# Patient Record
Sex: FEMALE | Race: BLACK OR AFRICAN AMERICAN | Employment: OTHER | URBAN - METROPOLITAN AREA
[De-identification: names, ages, dates, MRNs, and addresses within clinical notes are randomized per-mention and may not be internally consistent; named-entity substitution may affect disease eponyms.]

---

## 2017-07-14 ENCOUNTER — HOSPITAL ENCOUNTER (OUTPATIENT)
Dept: BONE DENSITY | Age: 77
Discharge: HOME | End: 2017-07-14
Attending: NURSE PRACTITIONER
Payer: MEDICARE

## 2017-07-14 DIAGNOSIS — M81.0 AGE-RELATED OSTEOPOROSIS WITHOUT CURRENT PATHOLOGICAL FRACTURE: ICD-10-CM

## 2017-07-14 PROCEDURE — 77080 DXA BONE DENSITY AXIAL: CPT

## 2017-12-27 ENCOUNTER — HOSPITAL ENCOUNTER (OUTPATIENT)
Dept: GENERAL RADIOLOGY | Age: 77
Discharge: HOME | End: 2017-12-27
Payer: MEDICARE

## 2017-12-27 DIAGNOSIS — I20.0 INTERMEDIATE CORONARY SYNDROME (HCC): ICD-10-CM

## 2017-12-27 PROCEDURE — 71020 XR CHEST PA LAT: CPT

## 2018-09-10 ENCOUNTER — HOSPITAL ENCOUNTER (OUTPATIENT)
Dept: GENERAL RADIOLOGY | Age: 78
Discharge: HOME | End: 2018-09-10
Payer: MEDICARE

## 2018-09-10 DIAGNOSIS — R91.8 LUNG MASS: ICD-10-CM

## 2018-09-10 PROCEDURE — 71046 X-RAY EXAM CHEST 2 VIEWS: CPT

## 2022-07-27 ENCOUNTER — APPOINTMENT (OUTPATIENT)
Dept: CT IMAGING | Age: 82
End: 2022-07-27
Attending: EMERGENCY MEDICINE
Payer: MEDICARE

## 2022-07-27 ENCOUNTER — APPOINTMENT (OUTPATIENT)
Dept: GENERAL RADIOLOGY | Age: 82
End: 2022-07-27
Attending: PHYSICIAN ASSISTANT
Payer: MEDICARE

## 2022-07-27 ENCOUNTER — HOSPITAL ENCOUNTER (EMERGENCY)
Age: 82
Discharge: HOME | End: 2022-07-27
Attending: EMERGENCY MEDICINE
Payer: MEDICARE

## 2022-07-27 VITALS
WEIGHT: 153.22 LBS | BODY MASS INDEX: 27.15 KG/M2 | TEMPERATURE: 97.9 F | RESPIRATION RATE: 14 BRPM | OXYGEN SATURATION: 98 % | HEIGHT: 63 IN | SYSTOLIC BLOOD PRESSURE: 138 MMHG | DIASTOLIC BLOOD PRESSURE: 78 MMHG | HEART RATE: 80 BPM

## 2022-07-27 DIAGNOSIS — S20.219A STERNAL CONTUSION, INITIAL ENCOUNTER: Primary | ICD-10-CM
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
## 2022-07-27 PROCEDURE — 99284 EMERGENCY DEPT VISIT MOD MDM: CPT

## 2022-07-27 PROCEDURE — 71250 CT THORAX DX C-: CPT

## 2022-07-27 PROCEDURE — 74011250637 HC RX REV CODE- 250/637: Performed by: EMERGENCY MEDICINE

## 2022-07-27 RX ORDER — NAPROXEN 500 MG/1
500 TABLET ORAL
Qty: 10 TABLET | Refills: 0 | Status: SHIPPED | OUTPATIENT
Start: 2022-07-27 | End: 2022-07-27

## 2022-07-27 RX ORDER — NAPROXEN 500 MG/1
500 TABLET ORAL
Qty: 10 TABLET | Refills: 0 | Status: SHIPPED | OUTPATIENT
Start: 2022-07-27 | End: 2022-08-01

## 2022-07-27 RX ORDER — ACETAMINOPHEN 500 MG
1000 TABLET ORAL ONCE
Status: COMPLETED | OUTPATIENT
Start: 2022-07-27 | End: 2022-07-27

## 2022-07-27 RX ADMIN — ACETAMINOPHEN 1000 MG: 500 TABLET ORAL at 10:54

## 2022-07-27 NOTE — ED NOTES
I have reviewed discharge instructions with the patient. The patient verbalized understanding. Pt walked out ER with steady gait.

## 2022-07-27 NOTE — ED NOTES
1000: Assumed care of Pt at this time. Pt is alert and oriented at this time. Pt is well appearing and clear speech. Pt HAS NO RESPIRATORY ISSUES (SOME MUSCULOSKELETAL PAIN WITH BREATHING.) No cardiac issues noted at this time. Pt c/o falling/tripping over one of her grand kids sleeping on the floor this morning and landed on the couch arm that is not padded. Pain is sternal and does not hurt at rest only when moving and taking deep breaths. Pt is sitting well in chair.

## 2022-07-27 NOTE — ED PROVIDER NOTES
EMERGENCY DEPARTMENT HISTORY AND PHYSICAL EXAM      Date: 7/27/2022  Patient Name: Poncho Camargo    History of Presenting Illness     Chief Complaint   Patient presents with    Sternum Injury     Tripped and fell this morning into a sofa on her sternum fell into the arm of her sofa. Pain at sternum bone when she moves-sitting still is no pain. History Provided By: Patient    HPI: Poncho Camargo, 81 y.o. female  presents to the ED with cc of sternum and rib pain. Patient states she tripped over one of her grandsons laying in the floor this morning. She fell onto the arm of her sofa which hit her right in the center of the chest.  Patient states at rest she has no pain. Movement and deep breaths causes pain in the center of her chest.  No other injuries reported. She did not hit her head. Past History     Past Medical History:  Past Medical History:   Diagnosis Date    Hypertension     Thyroid disease        Past Surgical History:  Past Surgical History:   Procedure Laterality Date    HX ORTHOPAEDIC      R knee       Medications:  No current facility-administered medications on file prior to encounter. Current Outpatient Medications on File Prior to Encounter   Medication Sig Dispense Refill    alendronate (FOSAMAX) 10 mg tablet Take 70 mg by mouth Daily (before breakfast). Amlodipine-Olmesartan 5-20 mg Tab Take  by mouth. hydrochlorothiazide (MICROZIDE) 12.5 mg capsule Take 12.5 mg by mouth daily. levothyroxine (LEVOTHROID) 100 mcg tablet Take 100 mcg by mouth Daily (before breakfast). simvastatin (ZOCOR) 20 mg tablet Take  by mouth nightly. Family History:  No family history on file. Social History:  Social History     Tobacco Use    Smoking status: Never    Smokeless tobacco: Never   Substance Use Topics    Alcohol use: No       Allergies:  No Known Allergies    All the above components of the past  history are auto-populated from the electronic record. They have been reviewed and the patient has been interviewed for any pertinent past history that pertains to the patient's chief complaint and reason for visit. Not all pre-populated components may be accurate at the time this note was generated. Review of Systems   Review of Systems   Constitutional:  Negative for chills and fever. HENT:  Negative for congestion, ear pain, rhinorrhea, sore throat and trouble swallowing. Eyes:  Negative for visual disturbance. Respiratory:  Negative for cough, chest tightness and shortness of breath. Cardiovascular:  Negative for chest pain and palpitations. Gastrointestinal:  Negative for abdominal pain, blood in stool, constipation, diarrhea, nausea and vomiting. Genitourinary:  Negative for decreased urine volume, difficulty urinating, dysuria and frequency. Musculoskeletal:  Negative for back pain and neck pain. Sternal pain   Skin:  Negative for color change and rash. Neurological:  Negative for dizziness, weakness, light-headedness and headaches. Physical Exam   Physical Exam  Vitals and nursing note reviewed. Constitutional:       General: She is not in acute distress. Appearance: She is well-developed. She is not ill-appearing. HENT:      Head: Normocephalic. Eyes:      Conjunctiva/sclera: Conjunctivae normal.   Cardiovascular:      Rate and Rhythm: Normal rate and regular rhythm. Pulmonary:      Effort: Pulmonary effort is normal. No accessory muscle usage or respiratory distress. Chest:      Chest wall: Tenderness present. Abdominal:      General: There is no distension. Musculoskeletal:      Cervical back: Normal range of motion. Skin:     General: Skin is warm and dry. Neurological:      Mental Status: She is alert and oriented to person, place, and time. Diagnostic Study Results     Labs -   No results found for this or any previous visit (from the past 24 hour(s)).     Radiologic Studies -   CT CHEST WO CONT Final Result   Bilateral lower lobe atelectasis. Stable 9 mm lingular nodule with   calcification. No acute abnormality. CT Results  (Last 48 hours)                 07/27/22 1019  CT CHEST WO CONT Final result    Impression:  Bilateral lower lobe atelectasis. Stable 9 mm lingular nodule with   calcification. No acute abnormality. Narrative:  INDICATION: Status post fall with sternal injury       COMPARISON: 5/9/2007       CONTRAST: None. TECHNIQUE:  5 mm axial images were obtained through the chest. Coronal and   sagittal reformats were generated. CT dose reduction was achieved through use   of a standardized protocol tailored for this examination and automatic exposure   control for dose modulation. The absence of intravenous contrast reduces the sensitivity for evaluation of   the mediastinum, marisol, vasculature, and upper abdominal organs. FINDINGS:       CHEST WALL: No mass or axillary lymphadenopathy. THYROID: No nodule. MEDIASTINUM: No mass or lymphadenopathy. MARISOL: No mass or lymphadenopathy. THORACIC AORTA: No aneurysm. MAIN PULMONARY ARTERY: Normal in caliber. TRACHEA/BRONCHI: Patent. ESOPHAGUS: No wall thickening or dilatation. HEART: Normal in size. PLEURA: No effusion or pneumothorax. LUNGS: 9 mm lingular nodule with central calcification is unchanged. Bilateral   lower lobe atelectasis is noted. INCIDENTALLY IMAGED UPPER ABDOMEN: No significant abnormality in the   incidentally imaged upper abdomen. BONES: Degenerative changes are seen in the thoracic spine. No acute fracture. CXR Results  (Last 48 hours)      None              Medical Decision Making     I reviewed the vital signs, available nursing notes, past medical history, past surgical history, family history and social history. Vital Signs-I have reviewed the vital signs that have been made available during the patient's emergency department visit.   The vital signs auto-populated below are obtained mostly by electronic means through monitoring devices that have been downloaded into the patient's chart by the nursing staff. Some vital signs are not downloaded into the chart until after the patient has been discharged and this note has been completed, therefore some vital signs may not be available to the physician for review prior to patient's discharge or admission. The physician has reviewed the patient's triage vital signs, monitored the electronic monitoring devices remotely for any significant vital sign abnormalities, and have reviewed vital signs prior to discharge. Some vital signs reviewed at bedside or remotely utilizing electronic monitoring devices may be different than the vital signs downloaded into the electronic medical record. Some vital signs may be erroneous and inaccurate since they are obtained by electronic monitoring devices, and not all vital signs are verified for accuracy by nursing staff prior to downloading into the patient's chart. Patient Vitals for the past 24 hrs:   Temp Pulse Resp BP SpO2   07/27/22 0949 97.9 °F (36.6 °C) 80 14 138/78 98 %         Records Reviewed: Nursing notes for today's visit have been reviewed. I have also reviewed most recent medical records pertinent to today's complaints, if available in our medical record system. I have also reviewed all labs and imaging results from previous results in comparison to results obtained today. If an EKG was obtained today, it has been compared to previous EKGs, if available. If arriving via EMS, the EMS report has been reviewed if made available to us within the patient's time in the emergency department. Provider Notes (Medical Decision Making):   Patient presents after fall onto her sofa. She has sternal pain and tenderness on exam.  CT imaging of the chest was obtained given the patient's advanced age. CT imaging did not show any sternal or rib fractures. No pneumothorax. Will recommend treatment of contusion of the chest wall. This may include acetaminophen and will prescribe naproxen. Apply ice for the next 2 days. Follow-up with primary care. ED Course:   Initial assessment performed. The patients presenting problems have been discussed, and they are in agreement with the care plan formulated and outlined with them. I have encouraged them to ask questions as they arise throughout their visit. Orders Placed This Encounter    CT CHEST WO CONT    acetaminophen (TYLENOL) tablet 1,000 mg    naproxen (NAPROSYN) 500 mg tablet       Procedures      Critical Care Time:   0    Disposition:  Discharge    The patient's emergency department evaluation is now complete. I have reviewed all labs, imaging, and pertinent information. I have discussed all results with the patient and/or family. Based on our evaluation today I do believe that the patient is safe to be discharged home. The patient has been provided with at home instructions that are pertinent to their complaint today, although these may not be specific to the exact diagnosis. I have reviewed the patient's home medications and attempted to reconcile if not already done so by pharmacy or nursing staff. I have discussed all new prescriptions with the patient. The patient has been encouraged to follow-up with primary care doctor and/or specialist, and these have been discussed with the patient. The patient has been advised that they may return to the emergency department if they have any worsening symptoms and or new symptoms that are of concern to them. Verbal discharge instructions may have also been provided to the patient that may not be specifically contained in the written discharge instructions. The patient has been given opportunity to ask questions prior to discharge. PLAN:  1.    Current Discharge Medication List        START taking these medications    Details   naproxen (NAPROSYN) 500 mg tablet Take 1 Tablet by mouth every twelve (12) hours as needed for Pain for up to 5 days. Qty: 10 Tablet, Refills: 0  Start date: 7/27/2022, End date: 8/1/2022           2. Follow-up Information       Follow up With Specialties Details Why Contact Info    Danny Dawson MD Family Medicine Schedule an appointment as soon as possible for a visit in 1 week  [facility]  [address] [phone]            Return to ED if worse     Diagnosis     Clinical Impression:   1. Sternal contusion, initial encounter    2.  Fall, initial encounter

## 2023-01-26 ENCOUNTER — OFFICE VISIT (OUTPATIENT)
Dept: CARDIOLOGY CLINIC | Age: 83
End: 2023-01-26
Payer: MEDICARE

## 2023-01-26 VITALS
HEIGHT: 62 IN | HEART RATE: 86 BPM | SYSTOLIC BLOOD PRESSURE: 125 MMHG | RESPIRATION RATE: 17 BRPM | DIASTOLIC BLOOD PRESSURE: 73 MMHG | WEIGHT: 160 LBS | OXYGEN SATURATION: 95 % | BODY MASS INDEX: 29.44 KG/M2

## 2023-01-26 DIAGNOSIS — R01.1 HEART MURMUR: ICD-10-CM

## 2023-01-26 DIAGNOSIS — M79.89 LEG SWELLING: Primary | ICD-10-CM

## 2023-01-26 PROCEDURE — 93000 ELECTROCARDIOGRAM COMPLETE: CPT | Performed by: NURSE PRACTITIONER

## 2023-01-26 PROCEDURE — 1123F ACP DISCUSS/DSCN MKR DOCD: CPT | Performed by: NURSE PRACTITIONER

## 2023-01-26 PROCEDURE — 99204 OFFICE O/P NEW MOD 45 MIN: CPT | Performed by: NURSE PRACTITIONER

## 2023-01-26 RX ORDER — FUROSEMIDE 20 MG/1
20 TABLET ORAL
COMMUNITY
Start: 2023-01-13

## 2023-01-26 RX ORDER — AMLODIPINE AND BENAZEPRIL HYDROCHLORIDE 5; 20 MG/1; MG/1
CAPSULE ORAL
COMMUNITY
Start: 2022-10-31 | End: 2023-01-26

## 2023-01-26 RX ORDER — FUROSEMIDE 20 MG/1
TABLET ORAL
Qty: 30 TABLET | Refills: 3 | Status: SHIPPED | OUTPATIENT
Start: 2023-01-26

## 2023-01-26 RX ORDER — BENAZEPRIL HYDROCHLORIDE 20 MG/1
20 TABLET ORAL DAILY
Qty: 90 TABLET | Refills: 2 | Status: SHIPPED | OUTPATIENT
Start: 2023-01-26

## 2023-01-26 NOTE — PATIENT INSTRUCTIONS
We are going to get an echocardiogram to take pictures of your heart to see if this could be a source of the swelling in your legs. Take your prescribed furosemide 20 mg 2 times daily for 5 days. This should help at least a little bit with the swelling. After you have completed the 5 days, start taking it 20 mg once daily (in the morning). Go to the lab to have your blood drawn today and we will try to follow-up with you on the results in less than 1 week. Stop taking your benazepril-amlodipine medication. The amlodipine could possibly be contributing to the swelling in your legs. Instead, I would like you to take benazepril 20 mg daily since her blood pressure is very well controlled at this point in time. We would like to see you back in about 3 weeks. It is very important to try and keep your legs elevated while you are resting. I also recommend trying your compression stockings again to see if this does help a little bit with the other measures at reducing your swelling.

## 2023-01-26 NOTE — PROGRESS NOTES
Subjective:   Natalie Mcneil is a 81 y.o.  female who presents to the clinic today to be evaluated for progressive bilateral lower extremity edema. She states that for the last month or so she started develop a sensation of bilateral leg heaviness/tightness and swelling. At the time that her symptoms started, she denies any significant lifestyle changes or medication changes. She tried using compression stockings for less than a week, and stopped using them because she did not notice any difference in the swelling. She also states that within the last 1 to 2 weeks she was also started on 20 mg of Lasix daily and was told to discontinue her prescribed HCTZ (12.5 mg). Likewise, she states that these adjustments have not resulted in any significant improvement in her lower extremity edema. She denies a history of similar symptoms. She seems to be in a very good state of health overall and she specifically denies any abnormal fatigue, lightheadedness/dizziness, palpitations, orthopnea, PND, new cough, shortness of breath, chest pains, or decreased exercise tolerance. She lives a fairly sedentary lifestyle and states that she sits with her legs in a dependent position for close to 80% of the day. Perhaps it could be most contributory she states that she has had a bug infestation in her house recently and for the last 3 weeks she has been sleeping in a chair instead of her bed. She has never had any echocardiograms, cardiac stress tests, or cardiac catheterizations. Primary Care Physician: Lety Angeles MD    Tobacco use: Never  Alcohol use: No  Illicit drug use: Never  Occupation: She is now retired. She used to work in a  center. Physical activity: Fairly sedentary lifestyle. States she is in a seated position about 80% of the day. No limitations with normal activities.   Cardiac risk factors: sedentary life style, hypertension, post-menopausal.    There is no problem list on file for this patient. No specialty comments available. Current Outpatient Medications   Medication Sig Dispense Refill    amLODIPine-benazepril (LOTREL) 5-20 mg per capsule TAKE 1 CAPSULE BY MOUTH EVERYDAY AT BEDTIME      furosemide (LASIX) 20 mg tablet Take 20 mg by mouth daily. levothyroxine (SYNTHROID) 100 mcg tablet Take 100 mcg by mouth Daily (before breakfast). simvastatin (ZOCOR) 20 mg tablet Take  by mouth nightly. Amlodipine-Olmesartan 5-20 mg Tab Take 1 Tablet by mouth daily. (Patient not taking: Reported on 1/26/2023)      hydrochlorothiazide (MICROZIDE) 12.5 mg capsule Take 12.5 mg by mouth daily. (Patient not taking: Reported on 1/26/2023)       No Known Allergies  Past Medical History:   Diagnosis Date    Hypertension     Thyroid disease      History reviewed. No pertinent family history. Past Surgical History:   Procedure Laterality Date    HX ORTHOPAEDIC      R knee     Social History     Tobacco Use    Smoking status: Never    Smokeless tobacco: Never   Substance Use Topics    Alcohol use: No        No results found for: CHOL, CHOLPOCT, CHOLX, CHLST, CHOLV, TOTCHOLEXT, HDL, HDLPOC, HDLEXT, HDLP, LDL, LDLCPOC, LDLCEXT, LDLC, DLDLP, VLDLC, VLDL, TGLX, TRIGL, TRIGLYCEXT, TRIGP, TGLPOCT, CHHD, CHHDX     No results found for: HBA1C, ZII7JNQV, HFG3YUHL     No results found for: NA, K, CL, CO2, AGAP, GLU, BUN, CREA, BUCR, GFRAA, GFRNA, CA, TBIL, TBILI, AP, TP, ALB, GLOB, AGRAT, ALT, AST     No results found for: WBC, WBCLT, HGBPOC, HGB, HGBP, HCTPOC, HCT, PHCT, RBCH, PLT, MCV, HGBEXT, HCTEXT, PLTEXT     Visit Vitals  /73 (BP 1 Location: Left upper arm, BP Patient Position: Sitting, BP Cuff Size: Large adult)   Pulse 86   Resp 17   Ht 5' 2.21\" (1.58 m)   Wt 160 lb (72.6 kg)   SpO2 95%   BMI 29.07 kg/m²    Body mass index is 29.07 kg/m². Review of Systems   Respiratory:  Negative for cough and shortness of breath. Cardiovascular:  Positive for leg swelling.  Negative for chest pain, palpitations, orthopnea, claudication and PND. Neurological:  Negative for dizziness. All other systems reviewed and are negative. Physical Exam  Vitals and nursing note reviewed. Constitutional:       General: She is not in acute distress. Appearance: She is obese. She is not ill-appearing. HENT:      Head: Normocephalic. Eyes:      General: Vision grossly intact. Extraocular Movements:      Right eye: No nystagmus. Left eye: No nystagmus. Neck:      Vascular: No carotid bruit or JVD. Cardiovascular:      Rate and Rhythm: Normal rate and regular rhythm. Pulses: Normal pulses. Heart sounds: No midsystolic click. Murmur heard. Systolic murmur is present with a grade of 4/6 at the upper left sternal border. Mild palpable thrill. Comments: There is taught edema present in both lower extremities with signs of mild stasis dermatitis. There is no extension of the edema above the level of the knees. The skin feels profusely warm and well-perfused. There is no subjective sensory disturbances of the lower extremities. There are no wounds to indicate PVD. There are no significant varicosities in the lower extremities. Pulmonary:      Effort: Pulmonary effort is normal. No tachypnea or respiratory distress. Breath sounds: No wheezing or rales. Chest:      Chest wall: No swelling. Abdominal:      General: Abdomen is flat. There is no distension. Tenderness: There is no abdominal tenderness. There is no guarding or rebound. Musculoskeletal:      Cervical back: Neck supple. No rigidity. Skin:     Comments: Patchy areas of hypopigmentation consistent with vitiligo   Neurological:      Mental Status: She is alert and oriented to person, place, and time. Mental status is at baseline. Motor: Motor function is intact. Coordination: Coordination is intact.    Psychiatric:         Attention and Perception: Attention and perception normal. EKG: Normal sinus rhythm. No ectopy. Normal axis and intervals. No ischemic abnormalities. Assessment/Plan:   1. Heart murmur  -She denies a history of a heart murmur. Luckily, she is not having any concerning cardiopulmonary complaints. However, based on her new symptoms of extremity swelling and murmur with palpable thrill, I did recommend a screening echocardiogram.    2. Leg swelling  -I do have some suspicion that the leg swelling could be coming from increased time with her legs bent to the dependent position. Her 5 mg of amlodipine, while it is not new, could possibly be contributing. Her blood pressure is perfect today. I did recommend discontinuing her amlodipine for now and taking 20 mg of Lasix twice daily x5 days and then once daily after that. She is going to get labs drawn immediately after her visit today and I will try to follow-up on those as soon as the results come back. She is to continue her benazepril at 20 mg. I provided extensive education on increasing her physical activity to help alleviate the edema as well as other measures such as using her compression stockings and elevating her legs when she is resting. Prior to this visit I have reviewed key aspects of the patient's medical record to optimize medical decision making. This includes, but is not limited to, prior office visits and emergency room encounters (if applicable). I have reviewed pertinent diagnostic test results (when available), vital signs, weights & and ambulatory blood pressure readings (when available), as well as personal and family medical history to develop an individualized care plan. I have spent time discussing both pharmacological and nonpharmacological treatment and preventative care measures for optimizing cardiovascular health and wellness.  This includes, but is not limited to: obtaining regular physical activity as tolerated, achieving ideal weight and blood pressure, healthy eating habits, smoking cessation, limiting or eliminating alcohol intake, and avoidance of recreational drug use. I have emphasized the importance of adherence to the current medication regimen as well as routine follow up for preventative care measures.       Igor Anton NP  [facility] at [address], [address], [facility] [address]: [phone]  F: [phone]

## 2023-01-26 NOTE — PROGRESS NOTES
Patient is not sure of the name of her medications. I advised to call me back when she gets home to update her list on file.

## 2023-01-27 LAB
ALBUMIN SERPL-MCNC: 4.7 G/DL (ref 3.6–4.6)
ALBUMIN/GLOB SERPL: 2.1 {RATIO} (ref 1.2–2.2)
ALP SERPL-CCNC: 85 IU/L (ref 44–121)
ALT SERPL-CCNC: 21 IU/L (ref 0–32)
AST SERPL-CCNC: 19 IU/L (ref 0–40)
BILIRUB SERPL-MCNC: 0.6 MG/DL (ref 0–1.2)
BUN SERPL-MCNC: 12 MG/DL (ref 8–27)
BUN/CREAT SERPL: 18 (ref 12–28)
CALCIUM SERPL-MCNC: 9.7 MG/DL (ref 8.7–10.3)
CHLORIDE SERPL-SCNC: 104 MMOL/L (ref 96–106)
CO2 SERPL-SCNC: 23 MMOL/L (ref 20–29)
CREAT SERPL-MCNC: 0.67 MG/DL (ref 0.57–1)
EGFR: 88 ML/MIN/1.73
GLOBULIN SER CALC-MCNC: 2.2 G/DL (ref 1.5–4.5)
GLUCOSE SERPL-MCNC: 100 MG/DL (ref 70–99)
MAGNESIUM SERPL-MCNC: 2.3 MG/DL (ref 1.6–2.3)
NT-PROBNP SERPL-MCNC: 103 PG/ML (ref 0–738)
POTASSIUM SERPL-SCNC: 4 MMOL/L (ref 3.5–5.2)
PROT SERPL-MCNC: 6.9 G/DL (ref 6–8.5)
SODIUM SERPL-SCNC: 144 MMOL/L (ref 134–144)

## 2023-01-30 ENCOUNTER — TELEPHONE (OUTPATIENT)
Dept: CARDIOLOGY CLINIC | Age: 83
End: 2023-01-30

## 2023-01-30 NOTE — TELEPHONE ENCOUNTER
I called and spoke with the patient, two identifiers verified. I have thoroughly discussed with the patient about provider's recommendation:  ----- Message from Bela Borja NP sent at 1/29/2023  9:20 PM EST -----  Regarding: Lab results  Shailesh Arevalo,    We can let Mrs Luisana Garcia know that her labs looked good. We will continue with the plan to do lasix BID x 5 days and once daily from there. We will see how her leg swelling does with this along with discontinuing her amlodipine. I believe we told her to see us back within 2-3 weeks after her visit last week. \"    Patient confirmed taking lasix BID, currently on her 4th day today. Stopped her Amlodipine. Still has leg swelling. I have also advised to elevate legs with 2-3 pillows and walk as a good form of exercise. Patient stated that she eats a lot of canned foods. I have also educated her about the importance of low/limit sodium in her diet. All questions answered. She verbalized understanding. No other concerns at this time.

## 2023-02-03 ENCOUNTER — HOSPITAL ENCOUNTER (EMERGENCY)
Age: 83
Discharge: HOME | End: 2023-02-03
Attending: EMERGENCY MEDICINE
Payer: MEDICARE

## 2023-02-03 VITALS
RESPIRATION RATE: 14 BRPM | TEMPERATURE: 98.1 F | HEART RATE: 93 BPM | WEIGHT: 161.82 LBS | DIASTOLIC BLOOD PRESSURE: 99 MMHG | SYSTOLIC BLOOD PRESSURE: 142 MMHG | HEIGHT: 62 IN | BODY MASS INDEX: 29.78 KG/M2 | OXYGEN SATURATION: 98 %

## 2023-02-03 DIAGNOSIS — R20.2 PARESTHESIAS IN RIGHT HAND: Primary | ICD-10-CM

## 2023-02-03 DIAGNOSIS — R20.2 PARESTHESIAS IN LEFT HAND: ICD-10-CM

## 2023-02-03 LAB
ALBUMIN SERPL-MCNC: 3.6 G/DL (ref 3.5–5)
ALBUMIN/GLOB SERPL: 1.1 (ref 1.1–2.2)
ALP SERPL-CCNC: 84 U/L (ref 45–117)
ALT SERPL-CCNC: 28 U/L (ref 12–78)
ANION GAP SERPL CALC-SCNC: 5 MMOL/L (ref 5–15)
AST SERPL-CCNC: 17 U/L (ref 15–37)
BASOPHILS # BLD: 0 K/UL (ref 0–0.1)
BASOPHILS NFR BLD: 0 % (ref 0–1)
BILIRUB SERPL-MCNC: 0.7 MG/DL (ref 0.2–1)
BUN SERPL-MCNC: 11 MG/DL (ref 6–20)
BUN/CREAT SERPL: 18 (ref 12–20)
CALCIUM SERPL-MCNC: 9.1 MG/DL (ref 8.5–10.1)
CHLORIDE SERPL-SCNC: 108 MMOL/L (ref 97–108)
CO2 SERPL-SCNC: 27 MMOL/L (ref 21–32)
COMMENT, HOLDF: NORMAL
CREAT SERPL-MCNC: 0.62 MG/DL (ref 0.55–1.02)
DIFFERENTIAL METHOD BLD: NORMAL
EOSINOPHIL # BLD: 0.1 K/UL (ref 0–0.4)
EOSINOPHIL NFR BLD: 1 % (ref 0–7)
ERYTHROCYTE [DISTWIDTH] IN BLOOD BY AUTOMATED COUNT: 13.3 % (ref 11.5–14.5)
GLOBULIN SER CALC-MCNC: 3.4 G/DL (ref 2–4)
GLUCOSE SERPL-MCNC: 100 MG/DL (ref 65–100)
HCT VFR BLD AUTO: 40.5 % (ref 35–47)
HGB BLD-MCNC: 13.8 G/DL (ref 11.5–16)
IMM GRANULOCYTES # BLD AUTO: 0 K/UL (ref 0–0.04)
IMM GRANULOCYTES NFR BLD AUTO: 0 % (ref 0–0.5)
LYMPHOCYTES # BLD: 2.8 K/UL (ref 0.8–3.5)
LYMPHOCYTES NFR BLD: 34 % (ref 12–49)
MCH RBC QN AUTO: 29.9 PG (ref 26–34)
MCHC RBC AUTO-ENTMCNC: 34.1 G/DL (ref 30–36.5)
MCV RBC AUTO: 87.9 FL (ref 80–99)
MONOCYTES # BLD: 0.7 K/UL (ref 0–1)
MONOCYTES NFR BLD: 9 % (ref 5–13)
NEUTS SEG # BLD: 4.5 K/UL (ref 1.8–8)
NEUTS SEG NFR BLD: 56 % (ref 32–75)
NRBC # BLD: 0 K/UL (ref 0–0.01)
NRBC BLD-RTO: 0 PER 100 WBC
PLATELET # BLD AUTO: 209 K/UL (ref 150–400)
PMV BLD AUTO: 9.5 FL (ref 8.9–12.9)
POTASSIUM SERPL-SCNC: 4.1 MMOL/L (ref 3.5–5.1)
PROT SERPL-MCNC: 7 G/DL (ref 6.4–8.2)
RBC # BLD AUTO: 4.61 M/UL (ref 3.8–5.2)
SAMPLES BEING HELD,HOLD: NORMAL
SODIUM SERPL-SCNC: 140 MMOL/L (ref 136–145)
TSH SERPL DL<=0.05 MIU/L-ACNC: 3.26 UIU/ML (ref 0.36–3.74)
WBC # BLD AUTO: 8.2 K/UL (ref 3.6–11)

## 2023-02-03 PROCEDURE — 99283 EMERGENCY DEPT VISIT LOW MDM: CPT

## 2023-02-03 PROCEDURE — 85025 COMPLETE CBC W/AUTO DIFF WBC: CPT

## 2023-02-03 PROCEDURE — 80053 COMPREHEN METABOLIC PANEL: CPT

## 2023-02-03 PROCEDURE — 84443 ASSAY THYROID STIM HORMONE: CPT

## 2023-02-03 PROCEDURE — 36415 COLL VENOUS BLD VENIPUNCTURE: CPT

## 2023-02-04 NOTE — ED PROVIDER NOTES
[facility] EMERGENCY DEPT  EMERGENCY DEPARTMENT ENCOUNTER       Pt Name: Rajesh Saucedo  MRN: [id number]  [place] [birth year]  Date of evaluation: 2/3/2023  Provider: Jean Paul Monreal MD   PCP: Heber Bullock MD  Note Started: 7:34 PM 2/3/23     CHIEF COMPLAINT       Chief Complaint   Patient presents with    Numbness     On Wednesday at first noticed the 4th an 5th fingers felt numb of the right hand  and then it progressed to  all fingers on right hand and now on the left hand today        HISTORY OF PRESENT ILLNESS: 1 or more elements      History From: Patient, daughter, History limited by: No limitations     Rajesh Saucedo is a 81 y.o. female with history of hypertension, hypothyroidism, leg edema who presents with chief complaint of paresthesias to bilateral upper extremities. Symptoms started in the fourth and fifth digits of the right side onset about 3 days ago, they then proceeded into the remaining 1 through 5 digits on the right side. She describes paresthesias but no numbness or weakness. It does not extend into the hand or thumb. Over the past 1 to 2 days she had the same symptoms occurred to the distal digits on the left side again excluding the thumb and hand. She denies any recent medication changes except for being instructed to double up on her Lasix for increased leg swelling. Otherwise denies any recent medication changes. She denies any new weakness, numbness. No injury or trauma. She denies any headache, change in vision, dysarthria, dysphagia, or difficulty with balance. Nursing Notes were all reviewed and agreed with or any disagreements were addressed in the HPI. REVIEW OF SYSTEMS        Positives and Pertinent negatives as per HPI.     PAST HISTORY     Past Medical History:  Past Medical History:   Diagnosis Date    Hypertension     Thyroid disease        Past Surgical History:  Past Surgical History:   Procedure Laterality Date    HX ORTHOPAEDIC      R knee       Family History:  No family history on file. Social History:  Social History     Tobacco Use    Smoking status: Never    Smokeless tobacco: Never   Substance Use Topics    Alcohol use: No       Allergies:  No Known Allergies    CURRENT MEDICATIONS      Discharge Medication List as of 2/3/2023  8:46 PM        CONTINUE these medications which have NOT CHANGED    Details   benazepriL (LOTENSIN) 20 mg tablet Take 1 Tablet by mouth daily. *STOP your Amlodipine-Benazepril, Normal, Disp-90 Tablet, R-2      furosemide (LASIX) 20 mg tablet Take 1 tablet twice daily for 5 days; Then take 1 tablet once a day., Normal, Disp-30 Tablet, R-3      levothyroxine (SYNTHROID) 100 mcg tablet Take 100 mcg by mouth Daily (before breakfast). , Historical Med      simvastatin (ZOCOR) 20 mg tablet Take  by mouth nightly. Historical Med             SCREENINGS               No data recorded         PHYSICAL EXAM      ED Triage Vitals [02/03/23 1528]   ED Encounter Vitals Group      BP (!) 142/99      Pulse (Heart Rate) 93      Resp Rate 14      Temp 98.1 °F (36.7 °C)      Temp src       O2 Sat (%) 98 %      Weight 161 lb 13.1 oz      Height 5' 2\"        Physical Exam  Vitals and nursing note reviewed. Constitutional:       General: She is not in acute distress. Appearance: Normal appearance. She is not ill-appearing. Cardiovascular:      Rate and Rhythm: Normal rate and regular rhythm. Heart sounds: No murmur heard. Pulmonary:      Effort: Pulmonary effort is normal. No respiratory distress. Breath sounds: Normal breath sounds. Neurological:      General: No focal deficit present. Mental Status: She is oriented to person, place, and time. Comments: Cranial nerves intact, motor 5/5 throughout all extremities, sensation intact, no cerebellar deficits. Ambulatory in the ED without difficulty. NIHSS is 0.         DIAGNOSTIC RESULTS   LABS:     Recent Results (from the past 12 hour(s))   CBC WITH AUTOMATED DIFF Collection Time: 02/03/23  6:35 PM   Result Value Ref Range    WBC 8.2 3.6 - 11.0 K/uL    RBC 4.61 3.80 - 5.20 M/uL    HGB 13.8 11.5 - 16.0 g/dL    HCT 40.5 35.0 - 47.0 %    MCV 87.9 80.0 - 99.0 FL    MCH 29.9 26.0 - 34.0 PG    MCHC 34.1 30.0 - 36.5 g/dL    RDW 13.3 11.5 - 14.5 %    PLATELET 230 765 - 718 K/uL    MPV 9.5 8.9 - 12.9 FL    NRBC 0.0 0  WBC    ABSOLUTE NRBC 0.00 0.00 - 0.01 K/uL    NEUTROPHILS 56 32 - 75 %    LYMPHOCYTES 34 12 - 49 %    MONOCYTES 9 5 - 13 %    EOSINOPHILS 1 0 - 7 %    BASOPHILS 0 0 - 1 %    IMMATURE GRANULOCYTES 0 0.0 - 0.5 %    ABS. NEUTROPHILS 4.5 1.8 - 8.0 K/UL    ABS. LYMPHOCYTES 2.8 0.8 - 3.5 K/UL    ABS. MONOCYTES 0.7 0.0 - 1.0 K/UL    ABS. EOSINOPHILS 0.1 0.0 - 0.4 K/UL    ABS. BASOPHILS 0.0 0.0 - 0.1 K/UL    ABS. IMM. GRANS. 0.0 0.00 - 0.04 K/UL    DF AUTOMATED     METABOLIC PANEL, COMPREHENSIVE    Collection Time: 02/03/23  6:35 PM   Result Value Ref Range    Sodium 140 136 - 145 mmol/L    Potassium 4.1 3.5 - 5.1 mmol/L    Chloride 108 97 - 108 mmol/L    CO2 27 21 - 32 mmol/L    Anion gap 5 5 - 15 mmol/L    Glucose 100 65 - 100 mg/dL    BUN 11 6 - 20 MG/DL    Creatinine 0.62 0.55 - 1.02 MG/DL    BUN/Creatinine ratio 18 12 - 20      eGFR >60 >60 ml/min/1.73m2    Calcium 9.1 8.5 - 10.1 MG/DL    Bilirubin, total 0.7 0.2 - 1.0 MG/DL    ALT (SGPT) 28 12 - 78 U/L    AST (SGOT) 17 15 - 37 U/L    Alk. phosphatase 84 45 - 117 U/L    Protein, total 7.0 6.4 - 8.2 g/dL    Albumin 3.6 3.5 - 5.0 g/dL    Globulin 3.4 2.0 - 4.0 g/dL    A-G Ratio 1.1 1.1 - 2.2     SAMPLES BEING HELD    Collection Time: 02/03/23  6:35 PM   Result Value Ref Range    SAMPLES BEING HELD PST     COMMENT        Add-on orders for these samples will be processed based on acceptable specimen integrity and analyte stability, which may vary by analyte. TSH 3RD GENERATION    Collection Time: 02/03/23  6:35 PM   Result Value Ref Range    TSH 3.26 0.36 - 3.74 uIU/mL        EKG:  If performed, independent interpretation documented below in the MDM section     RADIOLOGY:  Non-plain film images such as CT, Ultrasound and MRI are read by the radiologist. Plain radiographic images are visualized and preliminarily interpreted by the ED Provider with the findings documented in the MDM section. Interpretation per the Radiologist below, if available at the time of this note:     No results found. PROCEDURES   Unless otherwise noted below, none  Procedures     CRITICAL CARE TIME   0    EMERGENCY DEPARTMENT COURSE and DIFFERENTIAL DIAGNOSIS/MDM   Vitals:    Vitals:    02/03/23 1528   BP: (!) 142/99   Pulse: 93   Resp: 14   Temp: 98.1 °F (36.7 °C)   SpO2: 98%   Weight: 73.4 kg (161 lb 13.1 oz)   Height: 5' 2\" (1.575 m)        Patient was given the following medications:  Medications - No data to display    Medical Decision Making  Amount and/or Complexity of Data Reviewed  Independent Historian: caregiver     Details: daughter  Labs: ordered. Decision-making details documented in ED Course. Risk  OTC drugs. Prescription drug management. 81-year-old female presenting to the emergency department with paresthesias to the distal digits of her right and left upper extremity digits. She is afebrile and vital signs are stable. She denies any new weakness or numbness. She has no focal neurologic deficits on my exam and NIHSS is 0. Symptoms do not appear consistent with CVA, she has no complaints of headache, nausea, vision changes and I have no suspicion for acute ICH or SAH. Differential diagnosis includes metabolic abnormality including hyperglycemia, hypoglycemia, electrolyte abnormality especially given recent Lasix changes, consider medication reaction. I will evaluate with CBC, BMP, and reassess. CBC, BMP, and TSH all within normal limits, no sign of electrolyte abnormality or thyroid abnormality to explain her symptoms. Her paresthesias may be related to medications or recent diuresis with Lasix. Encourage close follow-up with PCP. No sign of acute CVA today. Patient and daughter given strict return ED precautions all questions answered. FINAL IMPRESSION     1. Paresthesias in right hand    2. Paresthesias in left hand          DISPOSITION/PLAN     Discharge Note:  The patient has been re-evaluated and is ready for discharge. Reviewed available results with patient. Counseled patient on diagnosis and care plan. Patient has expressed understanding, and all questions have been answered. Patient agrees with plan and agrees to follow up as recommended, or to return to the ED if their symptoms worsen. Discharge instructions have been provided and explained to the patient, along with reasons to return to the ED. CLINICAL IMPRESSION    1. Paresthesias in right hand    2. Paresthesias in left hand         DISPOSITION  Discharge     PATIENT REFERRED TO:  Follow-up Information       Follow up With Specialties Details Why Contact Info    Yamilka Nunes MD Family Medicine Schedule an appointment as soon as possible for a visit   [address] [place] [address]. [facility] [phone]      hospitals EMERGENCY DEPT Emergency Medicine Go to  As needed, If symptoms worsen [address] [place] [address]  [phone]              DISCHARGE MEDICATIONS:  Discharge Medication List as of 2/3/2023  8:46 PM            DISCONTINUED MEDICATIONS:  Discharge Medication List as of 2/3/2023  8:46 PM          I am the Primary Clinician of Record. Abraham Guzmán MD (electronically signed)    (Please note that parts of this dictation were completed with voice recognition software. Quite often unanticipated grammatical, syntax, homophones, and other interpretive errors are inadvertently transcribed by the computer software. Please disregards these errors.  Please excuse any errors that have escaped final proofreading.)

## 2023-02-04 NOTE — DISCHARGE INSTRUCTIONS
You were evaluated in the emergency department for paresthesias (numbness and tingling) of your fingers. Your examination was reassuring as was your work-up including blood work including electrolytes and thyroid levels. There are no signs of a stroke today. It will be important for you to follow-up with your primary care physician in 2-3 days. If you develop worsening symptoms such as numbness or weakness of your extremities or other stroke like symptoms, please return to the emergency department immediately.

## 2023-02-10 ENCOUNTER — APPOINTMENT (OUTPATIENT)
Dept: GENERAL RADIOLOGY | Age: 83
End: 2023-02-10
Attending: EMERGENCY MEDICINE
Payer: MEDICARE

## 2023-02-10 ENCOUNTER — APPOINTMENT (OUTPATIENT)
Dept: ULTRASOUND IMAGING | Age: 83
End: 2023-02-10
Attending: STUDENT IN AN ORGANIZED HEALTH CARE EDUCATION/TRAINING PROGRAM
Payer: MEDICARE

## 2023-02-10 ENCOUNTER — HOSPITAL ENCOUNTER (EMERGENCY)
Age: 83
Discharge: HOME | End: 2023-02-10
Attending: STUDENT IN AN ORGANIZED HEALTH CARE EDUCATION/TRAINING PROGRAM
Payer: MEDICARE

## 2023-02-10 ENCOUNTER — HOSPITAL ENCOUNTER (EMERGENCY)
Age: 83
Discharge: HOME | End: 2023-02-10
Attending: EMERGENCY MEDICINE
Payer: MEDICARE

## 2023-02-10 VITALS
DIASTOLIC BLOOD PRESSURE: 82 MMHG | SYSTOLIC BLOOD PRESSURE: 148 MMHG | OXYGEN SATURATION: 94 % | RESPIRATION RATE: 25 BRPM | TEMPERATURE: 97.6 F | BODY MASS INDEX: 30 KG/M2 | HEART RATE: 77 BPM | WEIGHT: 163 LBS | HEIGHT: 62 IN

## 2023-02-10 VITALS
HEART RATE: 80 BPM | SYSTOLIC BLOOD PRESSURE: 160 MMHG | RESPIRATION RATE: 27 BRPM | TEMPERATURE: 98.2 F | WEIGHT: 163 LBS | OXYGEN SATURATION: 91 % | BODY MASS INDEX: 30 KG/M2 | HEIGHT: 62 IN | DIASTOLIC BLOOD PRESSURE: 81 MMHG

## 2023-02-10 DIAGNOSIS — I47.1 SUPRAVENTRICULAR TACHYCARDIA (HCC): Primary | ICD-10-CM

## 2023-02-10 DIAGNOSIS — R77.8 TROPONIN LEVEL ELEVATED: ICD-10-CM

## 2023-02-10 DIAGNOSIS — I50.9 NEW ONSET OF CONGESTIVE HEART FAILURE (HCC): ICD-10-CM

## 2023-02-10 DIAGNOSIS — I47.1 SVT (SUPRAVENTRICULAR TACHYCARDIA) (HCC): Primary | ICD-10-CM

## 2023-02-10 LAB
ALBUMIN SERPL-MCNC: 3.4 G/DL (ref 3.5–5)
ALBUMIN/GLOB SERPL: 1 (ref 1.1–2.2)
ALP SERPL-CCNC: 76 U/L (ref 45–117)
ALT SERPL-CCNC: 21 U/L (ref 12–78)
ANION GAP SERPL CALC-SCNC: 7 MMOL/L (ref 5–15)
APPEARANCE UR: CLEAR
AST SERPL-CCNC: 20 U/L (ref 15–37)
ATRIAL RATE: 153 BPM
ATRIAL RATE: 83 BPM
BASOPHILS # BLD: 0 K/UL (ref 0–0.1)
BASOPHILS NFR BLD: 0 % (ref 0–1)
BILIRUB SERPL-MCNC: 0.7 MG/DL (ref 0.2–1)
BILIRUB UR QL: NEGATIVE
BNP SERPL-MCNC: 1977 PG/ML (ref 0–450)
BUN SERPL-MCNC: 15 MG/DL (ref 6–20)
BUN/CREAT SERPL: 20 (ref 12–20)
CALCIUM SERPL-MCNC: 8.8 MG/DL (ref 8.5–10.1)
CALCULATED P AXIS, ECG09: 29 DEGREES
CALCULATED R AXIS, ECG10: 39 DEGREES
CALCULATED R AXIS, ECG10: 48 DEGREES
CALCULATED T AXIS, ECG11: 12 DEGREES
CALCULATED T AXIS, ECG11: 36 DEGREES
CHLORIDE SERPL-SCNC: 106 MMOL/L (ref 97–108)
CO2 SERPL-SCNC: 28 MMOL/L (ref 21–32)
COLOR UR: NORMAL
CREAT SERPL-MCNC: 0.76 MG/DL (ref 0.55–1.02)
DIAGNOSIS, 93000: NORMAL
DIAGNOSIS, 93000: NORMAL
DIFFERENTIAL METHOD BLD: ABNORMAL
EOSINOPHIL # BLD: 0.1 K/UL (ref 0–0.4)
EOSINOPHIL NFR BLD: 1 % (ref 0–7)
ERYTHROCYTE [DISTWIDTH] IN BLOOD BY AUTOMATED COUNT: 13.2 % (ref 11.5–14.5)
GLOBULIN SER CALC-MCNC: 3.3 G/DL (ref 2–4)
GLUCOSE SERPL-MCNC: 128 MG/DL (ref 65–100)
GLUCOSE UR STRIP.AUTO-MCNC: NEGATIVE MG/DL
HCT VFR BLD AUTO: 40.3 % (ref 35–47)
HGB BLD-MCNC: 13.5 G/DL (ref 11.5–16)
HGB UR QL STRIP: NEGATIVE
IMM GRANULOCYTES # BLD AUTO: 0.1 K/UL (ref 0–0.04)
IMM GRANULOCYTES NFR BLD AUTO: 1 % (ref 0–0.5)
KETONES UR QL STRIP.AUTO: NEGATIVE MG/DL
LEUKOCYTE ESTERASE UR QL STRIP.AUTO: NEGATIVE
LYMPHOCYTES # BLD: 2.4 K/UL (ref 0.8–3.5)
LYMPHOCYTES NFR BLD: 31 % (ref 12–49)
MAGNESIUM SERPL-MCNC: 1.9 MG/DL (ref 1.6–2.4)
MCH RBC QN AUTO: 29.9 PG (ref 26–34)
MCHC RBC AUTO-ENTMCNC: 33.5 G/DL (ref 30–36.5)
MCV RBC AUTO: 89.2 FL (ref 80–99)
MONOCYTES # BLD: 0.7 K/UL (ref 0–1)
MONOCYTES NFR BLD: 9 % (ref 5–13)
NEUTS SEG # BLD: 4.6 K/UL (ref 1.8–8)
NEUTS SEG NFR BLD: 58 % (ref 32–75)
NITRITE UR QL STRIP.AUTO: NEGATIVE
NRBC # BLD: 0 K/UL (ref 0–0.01)
NRBC BLD-RTO: 0 PER 100 WBC
P-R INTERVAL, ECG05: 186 MS
PH UR STRIP: 6.5 (ref 5–8)
PLATELET # BLD AUTO: 198 K/UL (ref 150–400)
PMV BLD AUTO: 9.1 FL (ref 8.9–12.9)
POTASSIUM SERPL-SCNC: 3.8 MMOL/L (ref 3.5–5.1)
PROT SERPL-MCNC: 6.7 G/DL (ref 6.4–8.2)
PROT UR STRIP-MCNC: NEGATIVE MG/DL
Q-T INTERVAL, ECG07: 300 MS
Q-T INTERVAL, ECG07: 368 MS
QRS DURATION, ECG06: 114 MS
QRS DURATION, ECG06: 84 MS
QTC CALCULATION (BEZET), ECG08: 432 MS
QTC CALCULATION (BEZET), ECG08: 477 MS
RBC # BLD AUTO: 4.52 M/UL (ref 3.8–5.2)
SODIUM SERPL-SCNC: 141 MMOL/L (ref 136–145)
SP GR UR REFRACTOMETRY: 1.02
TROPONIN I SERPL HS-MCNC: 117 NG/L (ref 0–51)
TROPONIN I SERPL HS-MCNC: 127 NG/L (ref 0–51)
TROPONIN I SERPL HS-MCNC: 158 NG/L (ref 0–51)
UROBILINOGEN UR QL STRIP.AUTO: 1 EU/DL (ref 0.2–1)
VENTRICULAR RATE, ECG03: 152 BPM
VENTRICULAR RATE, ECG03: 83 BPM
WBC # BLD AUTO: 7.9 K/UL (ref 3.6–11)

## 2023-02-10 PROCEDURE — 93970 EXTREMITY STUDY: CPT

## 2023-02-10 PROCEDURE — 83880 ASSAY OF NATRIURETIC PEPTIDE: CPT

## 2023-02-10 PROCEDURE — 83735 ASSAY OF MAGNESIUM: CPT

## 2023-02-10 PROCEDURE — 36415 COLL VENOUS BLD VENIPUNCTURE: CPT

## 2023-02-10 PROCEDURE — 84484 ASSAY OF TROPONIN QUANT: CPT

## 2023-02-10 PROCEDURE — 85025 COMPLETE CBC W/AUTO DIFF WBC: CPT

## 2023-02-10 PROCEDURE — 99285 EMERGENCY DEPT VISIT HI MDM: CPT

## 2023-02-10 PROCEDURE — 81003 URINALYSIS AUTO W/O SCOPE: CPT

## 2023-02-10 PROCEDURE — 99284 EMERGENCY DEPT VISIT MOD MDM: CPT

## 2023-02-10 PROCEDURE — 71045 X-RAY EXAM CHEST 1 VIEW: CPT

## 2023-02-10 PROCEDURE — 93005 ELECTROCARDIOGRAM TRACING: CPT

## 2023-02-10 PROCEDURE — 80053 COMPREHEN METABOLIC PANEL: CPT

## 2023-02-10 PROCEDURE — 74011250637 HC RX REV CODE- 250/637: Performed by: EMERGENCY MEDICINE

## 2023-02-10 RX ORDER — GUAIFENESIN 100 MG/5ML
325 LIQUID (ML) ORAL
Status: COMPLETED | OUTPATIENT
Start: 2023-02-10 | End: 2023-02-10

## 2023-02-10 RX ORDER — DILTIAZEM HYDROCHLORIDE 120 MG/1
120 CAPSULE, COATED, EXTENDED RELEASE ORAL DAILY
Qty: 30 CAPSULE | Refills: 0 | Status: SHIPPED | OUTPATIENT
Start: 2023-02-10 | End: 2023-03-12

## 2023-02-10 RX ADMIN — ASPIRIN 81 MG 324 MG: 81 TABLET ORAL at 06:35

## 2023-02-10 NOTE — ED NOTES
TRANSFER - OUT REPORT:    Peyton Gilbert report given to Boby Chowdhury (name) on Pam Young  being transferred to [facility] ED (unit) for routine progression of care       Report consisted of patients Situation, Background, Assessment and   Recommendations(SBAR). Information from the following report(s) SBAR, ED Summary, MAR, and Recent Results was reviewed with the receiving nurse. Lines:   Peripheral IV 02/10/23 Left Antecubital (Active)   Site Assessment Clean, dry, & intact 02/10/23 0158   Phlebitis Assessment 0 02/10/23 0158   Infiltration Assessment 0 02/10/23 0158   Dressing Status Clean, dry, & intact 02/10/23 0158   Hub Color/Line Status Pink 02/10/23 0158   Action Taken Blood drawn 02/10/23 0158        Opportunity for questions and clarification was provided.       Patient transported with:   Monitor, EMS

## 2023-02-10 NOTE — ED NOTES
Call to give report to [facility] at this time. Unit secretary reports charge nurse tied up with an emergency, requests to call back in 15 minutes.         [phone]

## 2023-02-10 NOTE — ED NOTES
0145: MD at bedside performing vagal maneuvers with patient.  Pt's legs elevated and patient instructed to bear down and hold her breath; rhythm successfully converted and <100 BPM. Repeat EKG obtained and given to MD.

## 2023-02-10 NOTE — ED NOTES
Patient resting comfortably in bed at this time. Patient states she feels well at this time, no acute distress.

## 2023-02-10 NOTE — ED NOTES
Verbal shift change report given to Aerial RN (oncoming nurse) by Ramirez Carson RN (offgoing nurse). Report included the following information SBAR, Kardex, ED Summary, [facility] and Recent Results.

## 2023-02-10 NOTE — ED PROVIDER NOTES
[facility] EMERGENCY DEPT  EMERGENCY DEPARTMENT ENCOUNTER       Pt Name: Armida Rodriguez  MRN: [id number]  [place] [birth year]  Date of evaluation: 2/10/2023  Provider: Bi Delgado MD   PCP: Connie Mccarthy MD  Note Started: 1:48 AM 2/10/23     CHIEF COMPLAINT       Chief Complaint   Patient presents with    Irregular Heart Beat        HISTORY OF PRESENT ILLNESS: 1 or more elements      History From: patient, History limited by:  none     Armida Rodriguez is a 81 y.o. female who presents to the ED complaint of palpitations which she has had since 4 PM.  States otherwise she feels fine. Denies dizziness, shortness of breath or chest pain. Has had similar episodes before but normally they self resolved. Arrives ambulatory to the ED with a heart rate in the 150s. Nurses immediately did an EKG which showed SVT. I went to patient's bedside and did a modified Valsalva maneuver which converted patient's rhythm to sinus. Awaiting repeat EKG. Patient states she has never been diagnosed with a dysrhythmia; she only has experienced subjective palpitations which have previously self-resolved. Denies nausea, vomiting, recent illness. Nursing Notes were all reviewed and agreed with or any disagreements were addressed in the HPI. REVIEW OF SYSTEMS        Positives and Pertinent negatives as per HPI. PAST HISTORY     Past Medical History:  Past Medical History:   Diagnosis Date    Hypertension     Thyroid disease        Past Surgical History:  Past Surgical History:   Procedure Laterality Date    HX ORTHOPAEDIC      R knee       Family History:  No family history on file. Social History:  Social History     Tobacco Use    Smoking status: Never    Smokeless tobacco: Never   Substance Use Topics    Alcohol use: No       Allergies:  No Known Allergies    CURRENT MEDICATIONS      Previous Medications    BENAZEPRIL (LOTENSIN) 20 MG TABLET    Take 1 Tablet by mouth daily.  *STOP your Amlodipine-Benazepril FUROSEMIDE (LASIX) 20 MG TABLET    Take 1 tablet twice daily for 5 days; Then take 1 tablet once a day. LEVOTHYROXINE (SYNTHROID) 100 MCG TABLET    Take 100 mcg by mouth Daily (before breakfast). SIMVASTATIN (ZOCOR) 20 MG TABLET    Take  by mouth nightly. SCREENINGS               No data recorded         PHYSICAL EXAM      ED Triage Vitals [02/10/23 0145]   ED Encounter Vitals Group      /81      Pulse (Heart Rate) (!) 152      Resp Rate 18      Temp 97.6 °F (36.4 °C)      Temp src       O2 Sat (%) 95 %      Weight 163 lb      Height 5' 2\"        Physical Exam  Constitutional:       General: She is not in acute distress. Appearance: She is not ill-appearing, toxic-appearing or diaphoretic. Comments: Sitting up in NAD calmly giving history. Cardiovascular:      Rate and Rhythm: Tachycardia present. Pulmonary:      Effort: Pulmonary effort is normal. No respiratory distress. Abdominal:      General: Abdomen is flat. Palpations: Abdomen is soft. Musculoskeletal:      Right lower leg: Edema present. Left lower leg: Edema present. Skin:     General: Skin is warm and dry. Neurological:      General: No focal deficit present.    Psychiatric:         Mood and Affect: Mood normal.        DIAGNOSTIC RESULTS   LABS:     Recent Results (from the past 12 hour(s))   EKG, 12 LEAD, INITIAL    Collection Time: 02/10/23  1:43 AM   Result Value Ref Range    Ventricular Rate 152 BPM    Atrial Rate 153 BPM    QRS Duration 114 ms    Q-T Interval 300 ms    QTC Calculation (Bezet) 477 ms    Calculated R Axis 39 degrees    Calculated T Axis 12 degrees    Diagnosis       Supraventricular tachycardia  Nonspecific ST abnormality  Abnormal ECG  No previous ECGs available     CBC WITH AUTOMATED DIFF    Collection Time: 02/10/23  1:54 AM   Result Value Ref Range    WBC 7.9 3.6 - 11.0 K/uL    RBC 4.52 3.80 - 5.20 M/uL    HGB 13.5 11.5 - 16.0 g/dL    HCT 40.3 35.0 - 47.0 %    MCV 89.2 80.0 - 99.0 FL MCH 29.9 26.0 - 34.0 PG    MCHC 33.5 30.0 - 36.5 g/dL    RDW 13.2 11.5 - 14.5 %    PLATELET 191 069 - 542 K/uL    MPV 9.1 8.9 - 12.9 FL    NRBC 0.0 0  WBC    ABSOLUTE NRBC 0.00 0.00 - 0.01 K/uL    NEUTROPHILS 58 32 - 75 %    LYMPHOCYTES 31 12 - 49 %    MONOCYTES 9 5 - 13 %    EOSINOPHILS 1 0 - 7 %    BASOPHILS 0 0 - 1 %    IMMATURE GRANULOCYTES 1 (H) 0.0 - 0.5 %    ABS. NEUTROPHILS 4.6 1.8 - 8.0 K/UL    ABS. LYMPHOCYTES 2.4 0.8 - 3.5 K/UL    ABS. MONOCYTES 0.7 0.0 - 1.0 K/UL    ABS. EOSINOPHILS 0.1 0.0 - 0.4 K/UL    ABS. BASOPHILS 0.0 0.0 - 0.1 K/UL    ABS. IMM. GRANS. 0.1 (H) 0.00 - 0.04 K/UL    DF AUTOMATED     MAGNESIUM    Collection Time: 02/10/23  1:54 AM   Result Value Ref Range    Magnesium 1.9 1.6 - 2.4 mg/dL   TROPONIN-HIGH SENSITIVITY    Collection Time: 02/10/23  1:54 AM   Result Value Ref Range    Troponin-High Sensitivity 117 (H) 0 - 51 ng/L   NT-PRO BNP    Collection Time: 02/10/23  1:54 AM   Result Value Ref Range    NT pro-BNP 1,977 (H) 0 - 051 PG/ML   METABOLIC PANEL, COMPREHENSIVE    Collection Time: 02/10/23  1:54 AM   Result Value Ref Range    Sodium 141 136 - 145 mmol/L    Potassium 3.8 3.5 - 5.1 mmol/L    Chloride 106 97 - 108 mmol/L    CO2 28 21 - 32 mmol/L    Anion gap 7 5 - 15 mmol/L    Glucose 128 (H) 65 - 100 mg/dL    BUN 15 6 - 20 MG/DL    Creatinine 0.76 0.55 - 1.02 MG/DL    BUN/Creatinine ratio 20 12 - 20      eGFR >60 >60 ml/min/1.73m2    Calcium 8.8 8.5 - 10.1 MG/DL    Bilirubin, total 0.7 0.2 - 1.0 MG/DL    ALT (SGPT) 21 12 - 78 U/L    AST (SGOT) 20 15 - 37 U/L    Alk. phosphatase 76 45 - 117 U/L    Protein, total 6.7 6.4 - 8.2 g/dL    Albumin 3.4 (L) 3.5 - 5.0 g/dL    Globulin 3.3 2.0 - 4.0 g/dL    A-G Ratio 1.0 (L) 1.1 - 2.2          EKG:  If performed, independent interpretation documented below in the MDM section     RADIOLOGY:  Non-plain film images such as CT, Ultrasound and MRI are read by the radiologist. Plain radiographic images are visualized and preliminarily interpreted by the ED Provider with the findings documented in the MDM section. Interpretation per the Radiologist below, if available at the time of this note:     XR CHEST PORT    Result Date: 2/10/2023  INDICATION: Palpitations COMPARISON: September 10, 2018 FINDINGS: PA and lateral views of the chest demonstrate a stable cardiomediastinal silhouette. The right hemidiaphragm is elevated. There is right basilar airspace disease. . The visualized osseous structures are unremarkable. Right basilar airspace disease may represent atelectasis or pneumonia. Elevated right hemidiaphragm. PROCEDURES   Unless otherwise noted below, none  Procedures       EMERGENCY DEPARTMENT COURSE and DIFFERENTIAL DIAGNOSIS/MDM   Vitals:    Vitals:    02/10/23 0145 02/10/23 0152 02/10/23 0300   BP: 122/81  136/84   Pulse: (!) 152 88 72   Resp: 18 14 22   Temp: 97.6 °F (36.4 °C)     SpO2: 95% 97% 95%   Weight: 73.9 kg (163 lb)     Height: 5' 2\" (1.575 m)          Patient was given the following medications:  Medications - No data to display    Medical Decision Making  Amount and/or Complexity of Data Reviewed  Labs: ordered. Decision-making details documented in ED Course. Radiology: ordered. ECG/medicine tests: ordered. Details: EKG done at 1:43 (on arrival): SVT, rate 152, normal axis, , no concerning ST segment changes. Interpreted by med. EKG done at 01:50 (after modified valsalva maneuver): NSR rate 83, normal axis,, normal intervals, normal st segments. No ischemia. Interpreted by me. **PLEASE SEE ED COURSE DETAILS BELOW FOR FURTHER MDM DETAILS:  ED Course as of 02/10/23 0517   Fri Feb 10, 2023   7156 Per chart review, patient recently saw cardiology for recent onset pedal edema. She has recently been started on diuretics. Per cardiology note dated 1/26/23 plan was for out-patient echo. Patient states the echo was scheduled for 2/14/23.  Will now need in-patient work-up given elevated trop, elevated bnp, and svt. 2031 I asked patient whether she prefers admission to [facility] or [facility] (she will likely be ED to ED transfer to either facility), and she states she wants to go to [facility]. Spoke with Dr. Juan Pablo Peters, hospitalist at [facility], who accepts patient for admission. Cannot stay at this facility b/c (1) we do not have in-patient cardiology and (2) we do not have icu/step-down units, so if she reverted to SVT on the floor it would be higher level of care than floor nurses could handle. . There are no current available beds at [facility] so patient will be ED to ED transfer. [SS]   0502 Accepted by Dr. Mary Beasley to ED at [facility].  She will hold in our ED for the next 6 hours while they attempt to find her an inpatient bed because there ED is saturated with over 20 homes. If 6 hours past and she has not yet been assigned an inpatient bed, then she will be transferred to their ED. This will be at 11 AM.  This was communicated to the RN who will be here on day shift [SS]      ED Course User Index  [SS] Mariam ePrry MD     CRITICAL CARE NOTE :    4:50 AM      IMPENDING DETERIORATION -Cardiovascular, CNS, and Metabolic    ASSOCIATED RISK FACTORS - Hypotension, Shock, Metabolic changes, Vascular Compromise, and CNS Decompensation    MANAGEMENT- Bedside Assessment, Supervision of Care, and Transfer    INTERPRETATION -  Xrays, ECG, Blood Pressure, and Cardiac Output Measures     INTERVENTIONS - hemodynamic mngmt, Neurologic interventions , and Metobolic interventions    CASE REVIEW - Hospitalist/Intensivist and Medical Sub-Specialist, nursing    TREATMENT RESPONSE -Improved    PERFORMED BY - Self        NOTES   :      I have spent 90 minutes of critical care time involved in lab review, consultations with specialist(s), conversations with nursing, family decision- making, bedside attention and documentation. During this entire length of time I was immediately available to the patient.  Length of critical care time documented does NOT include separate billable procedure(s). Kymberly Ramirez MD      FINAL IMPRESSION     1. SVT (supraventricular tachycardia) ([facility] [address]    2. Troponin level elevated    3. New onset of congestive heart failure ([facility] [address]          DISPOSITION/PLAN   Edwin Noyola's  results have been reviewed with her. She has been counseled regarding her diagnosis, treatment, and plan. She verbally conveys understanding and agreement of the signs, symptoms, diagnosis, treatment and prognosis and additionally agrees to follow up as discussed. She also agrees with the care-plan and conveys that all of her questions have been answered. I have also provided discharge instructions for her that include: educational information regarding their diagnosis and treatment, and list of reasons why they would want to return to the ED prior to their follow-up appointment, should her condition change. PATIENT REFERRED TO:  Follow-up Information    None           DISCHARGE MEDICATIONS:  Current Discharge Medication List            DISCONTINUED MEDICATIONS:  Current Discharge Medication List          I am the Primary Clinician of Record. Kymberly Ramirez MD (electronically signed)    (Please note that parts of this dictation were completed with voice recognition software. Quite often unanticipated grammatical, syntax, homophones, and other interpretive errors are inadvertently transcribed by the computer software. Please disregards these errors.  Please excuse any errors that have escaped final proofreading.)

## 2023-02-10 NOTE — ED TRIAGE NOTES
Pt presents to ED reporting palpitations x tonight. Pt denies chest pain currently or other symptoms. HR in 150's during triage, EKG obtained, cardiac monitor placed and MD notified. Pt noted to be in SVT. Reports feeling palpitations in the past but they were self resolving; however, pt never formally diagnosed with cardiac arrhythmia.

## 2023-02-10 NOTE — ED TRIAGE NOTES
Pt arrived from [facility] with no complaints at this time. Pt was seen there for palpitations on and off and pt found to be in SVT.  Noted elevated trop and BNP at [facility] and transferred to see cardiology

## 2023-02-10 NOTE — DISCHARGE INSTRUCTIONS
If your symptoms change or worsen, return to the ER as soon as possible. You have been started on a new medication can slow your heart rate and your blood pressure. Continue to monitor your heart rate and blood pressure at home. If you are feeling weak, dizzy or feeling like you are going to pass out, check your blood pressure and your heart rate and return to the emergency department if you continue to have the symptoms. You should go to your previously scheduled appointment on the 14th for your echo. Please schedule a follow-up appointment with cardiology next week. Right Lower Venous    No evidence of deep vein thrombosis in the right lower extremity. No evidence of deep vein thrombosis in the common femoral, profunda femoral, femoral, popliteal, posterior tibial, and peroneal veins. The veins were imaged in the transverse and longitudinal planes. The vessels showed normal color filling and compressibility. Doppler interrogation showed phasic and spontaneous flow. Left Lower Venous    No evidence of deep vein thrombosis in the left lower extremity. No evidence of deep vein thrombosis in the common femoral, profunda femoral, femoral, popliteal, posterior tibial, and peroneal veins. The veins were imaged in the transverse and longitudinal planes. The vessels showed normal color filling and compressibility. Doppler interrogation showed phasic and spontaneous flow.

## 2023-02-11 NOTE — ED PROVIDER NOTES
[facility] EMERGENCY DEPT  EMERGENCY DEPARTMENT ENCOUNTER       Pt Name: Josey Latham  MRN: [id number]  [place] [birth year]  Date of evaluation: 2/10/2023  Provider: Colette Mcginnis DO   PCP: Sushila Bob MD  Note Started: 6:09 AM 2/11/23     CHIEF COMPLAINT       Chief Complaint   Patient presents with    Palpitations        HISTORY OF PRESENT ILLNESS: 1 or more elements      History From: Patient  HPI Limitations : None     Josey Latham is a 81 y.o. female who presents to the emergency department with chief complaint of referral from outside hospital.  Patient presented this morning to [facility] for palpitations. She was found to be in SVT which was broken with vagal maneuvers. Her troponin was found to be mildly elevated at 111 and then increasing to 158. She was excepted for admission by the hospitalist here for cardiology evaluation. She has no cardiac history, she recently saw [facility] cardiology for lower extremity edema, they scheduled her for an echo on 2/14. She has had no recurrent episodes of SVT since. She was waiting for bed when she was transferred from [address] [place] [address] to this ER. She denies any recent illnesses. She denies any complaints. She states she never had chest pain or was never short of breath with that she just felt like her heart was beating hard. Nursing Notes were all reviewed and agreed with or any disagreements were addressed in the HPI. REVIEW OF SYSTEMS      Review of Systems   Constitutional:  Negative for chills and fever. Respiratory:  Negative for shortness of breath. Cardiovascular:  Positive for palpitations. Gastrointestinal:  Negative for abdominal pain, diarrhea, nausea and vomiting. Skin:  Negative for rash. Neurological:  Negative for weakness and headaches. Positives and Pertinent negatives as per HPI.     PAST HISTORY     Past Medical History:  Past Medical History:   Diagnosis Date    Hypertension     Thyroid disease        Past Surgical History:  Past Surgical History:   Procedure Laterality Date    HX ORTHOPAEDIC      R knee       Family History:  No family history on file. Social History:  Social History     Tobacco Use    Smoking status: Never    Smokeless tobacco: Never   Substance Use Topics    Alcohol use: No       Allergies:  No Known Allergies    CURRENT MEDICATIONS      Discharge Medication List as of 2/10/2023  5:08 PM        CONTINUE these medications which have NOT CHANGED    Details   benazepriL (LOTENSIN) 20 mg tablet Take 1 Tablet by mouth daily. *STOP your Amlodipine-Benazepril, Normal, Disp-90 Tablet, R-2      furosemide (LASIX) 20 mg tablet Take 1 tablet twice daily for 5 days; Then take 1 tablet once a day., Normal, Disp-30 Tablet, R-3      levothyroxine (SYNTHROID) 100 mcg tablet Take 100 mcg by mouth Daily (before breakfast). , Historical Med      simvastatin (ZOCOR) 20 mg tablet Take  by mouth nightly. Historical Med             SCREENINGS               No data recorded         PHYSICAL EXAM      ED Triage Vitals [02/10/23 1420]   ED Encounter Vitals Group      BP (!) 149/84      Pulse (Heart Rate) 80      Resp Rate 18      Temp 98.2 °F (36.8 °C)      Temp src       O2 Sat (%) 95 %      Weight 163 lb      Height 5' 2\"        Physical Exam  Vitals and nursing note reviewed. Constitutional:       Appearance: Normal appearance. HENT:      Head: Normocephalic and atraumatic. Mouth/Throat:      Mouth: Mucous membranes are moist.   Eyes:      Conjunctiva/sclera: Conjunctivae normal.   Cardiovascular:      Rate and Rhythm: Normal rate and regular rhythm. Pulmonary:      Effort: Pulmonary effort is normal.      Breath sounds: Normal breath sounds. Abdominal:      General: Abdomen is flat. Palpations: Abdomen is soft. Musculoskeletal:      Right lower leg: Edema present. Left lower leg: Edema present. Skin:     General: Skin is warm.       Capillary Refill: Capillary refill takes less than 2 seconds. Neurological:      Mental Status: She is alert. Mental status is at baseline. DIAGNOSTIC RESULTS   LABS:     No results found for this or any previous visit (from the past 12 hour(s)). EKG interpreted by me: Sinus     RADIOLOGY:  Non-plain film images such as CT, Ultrasound and MRI are read by the radiologist. Plain radiographic images are visualized and preliminarily interpreted by the ED Provider with the below findings:       Interpretation per the Radiologist below, if available at the time of this note:     DUPLEX LOWER EXT VENOUS BILAT    Result Date: 2/10/2023  · No evidence of acute deep vein thrombosis in the right common femoral, femoral, popliteal, posterior tibial, and peroneal veins. The veins were imaged in the transverse and longitudinal planes. The vessels showed normal color filling and compressibility. Doppler interrogation showed phasic and spontaneous flow. · No evidence of deep vein thrombosis in the right lower extremity. · No evidence of deep vein thrombosis in the left lower extremity. · No evidence of acute deep vein thrombosis in the left common femoral, femoral, popliteal, posterior tibial, and peroneal veins. The veins were imaged in the transverse and longitudinal planes. The vessels showed normal color filling and compressibility. Doppler interrogation showed phasic and spontaneous flow.          PROCEDURES   Unless otherwise noted below, none  Procedures     CRITICAL CARE TIME       EMERGENCY DEPARTMENT COURSE and DIFFERENTIAL DIAGNOSIS/MDM   Vitals:    Vitals:    02/10/23 1420 02/10/23 1530 02/10/23 1645   BP: (!) 149/84 (!) 146/78 (!) 160/81   Pulse: 80 78 80   Resp: 18 22 27   Temp: 98.2 °F (36.8 °C)     SpO2: 95% 91% 91%   Weight: 73.9 kg (163 lb)     Height: 5' 2\" (1.575 m)          Patient was given the following medications:  Medications - No data to display    CONSULTS: (Who and What was discussed)  None    Chronic Conditions: Hypertension    Social Determinants affecting Dx or Tx: None    Records Reviewed (source and summary of external notes): Prior medical records, Previous Radiology studies, Previous Laboratory studies, and Previous EKGs    CC/HPI Summary, DDx, ED Course, and Reassessment: Patient presenting with c/c of referral from OSH for admission for echo, cardiology evaluation. On exam patient is hemodynamically stable, vital signs stable. Her troponin went from 111 > 158. She has an outpatient echo scheduled for 12/14 by her cardiologist. She is hemodynamically stable with no complaints. She has had no chest pain or shortness of breath. Will rpeeat troponin, obtain BL lower extremity duplex and discuss with cardiology. I spoke with Dr. Anson Hidalgo, discussed her workup and presentation. Given the inpatient volume and current delays in obtaining inpatient echo he is not sure if patient will be able to obtain an inpatient echo. She has not fluid overloaded, is not hypoxic or having any difficulty ambulating. She has been asymptomatic in terms of chest pain or shortness of breath with this current presentation. She has no evidence of decompensated heart failure. After discussion of patient's lab work, history, imaging he recommended Cardizem 120 mg and follow-up in the office next week. She has follow-up with her cardiologist that she had previously seen on 2/14. I discussed the risk versus benefits of starting this medication as well as the risks versus benefits of discharge versus inpatient hospitalization. Patient elected to go home instead of being admitted. I discussed that she should check her heart rate and blood pressure while on cardizem and if she has any worsening of her symptoms, develops palpitations, near-syncope, weakness or any other concerning signs or symptoms to present to the ER and she understood.          Disposition Considerations (Tests not done, Shared Decision Making, Pt Expectation of Test or Tx.): FINAL IMPRESSION     1. Supraventricular tachycardia ([facility] [address]          DISPOSITION/PLAN   Discharged         PATIENT REFERRED TO:  Follow-up Information       Follow up With Specialties Details Why Contact Info    Angela Schaeffer MD Family Medicine Schedule an appointment as soon as possible for a visit in 1 week  [facility]      Ashley Lopez MD Cardiovascular Disease Physician, [address] [facility], Internal Medicine Physician Schedule an appointment as soon as possible for a visit in 1 week  [address]  [phone]      [address] [place] [address] EMERGENCY DEPT Emergency Medicine  If symptoms worsen [address] [place] [address]  [phone]              DISCHARGE MEDICATIONS:  Discharge Medication List as of 2/10/2023  5:08 PM            DISCONTINUED MEDICATIONS:  Discharge Medication List as of 2/10/2023  5:08 PM            (Please note that parts of this dictation were completed with voice recognition software. Quite often unanticipated grammatical, syntax, homophones, and other interpretive errors are inadvertently transcribed by the computer software. Please disregards these errors.  Please excuse any errors that have escaped final proofreading.)    Haven Curd, DO

## 2023-02-14 ENCOUNTER — HOSPITAL ENCOUNTER (OUTPATIENT)
Dept: NON INVASIVE DIAGNOSTICS | Age: 83
Discharge: HOME | End: 2023-02-14
Attending: NURSE PRACTITIONER
Payer: MEDICARE

## 2023-02-14 DIAGNOSIS — M79.89 LEG SWELLING: ICD-10-CM

## 2023-02-14 DIAGNOSIS — R01.1 HEART MURMUR: ICD-10-CM

## 2023-02-14 LAB
ECHO AO ROOT DIAM: 2.4 CM
ECHO AR MAX VEL PISA: 2.3 M/S
ECHO AV AREA PEAK VELOCITY: 2.1 CM2
ECHO AV AREA PLAN: 1.7 CM2
ECHO AV MEAN GRADIENT: 3 MMHG
ECHO AV MEAN VELOCITY: 0.8 M/S
ECHO AV PEAK GRADIENT: 6 MMHG
ECHO AV PEAK VELOCITY: 1.2 M/S
ECHO AV REGURGITANT PHT: 907.3 MILLISECOND
ECHO AV VELOCITY RATIO: 0.83
ECHO AV VTI: 22.1 CM
ECHO EST RA PRESSURE: 5 MMHG
ECHO LA DIAMETER: 1.5 CM
ECHO LA TO AORTIC ROOT RATIO: 0.63
ECHO LA VOL 4C: 49 ML (ref 22–52)
ECHO LV EDV A4C: 71 ML
ECHO LV EJECTION FRACTION A4C: 46 %
ECHO LV ESV A4C: 38 ML
ECHO LV FRACTIONAL SHORTENING: 41 % (ref 28–44)
ECHO LV INTERNAL DIMENSION DIASTOLIC: 2.9 CM (ref 3.9–5.3)
ECHO LV INTERNAL DIMENSION SYSTOLIC: 1.7 CM
ECHO LV IVSD: 1.6 CM (ref 0.6–0.9)
ECHO LV MASS 2D: 97.3 G (ref 67–162)
ECHO LV POSTERIOR WALL DIASTOLIC: 0.7 CM (ref 0.6–0.9)
ECHO LV RELATIVE WALL THICKNESS RATIO: 0.48
ECHO LVOT AREA: 2.5 CM2
ECHO LVOT DIAM: 1.8 CM
ECHO LVOT PEAK GRADIENT: 4 MMHG
ECHO LVOT PEAK VELOCITY: 1 M/S
ECHO MV A VELOCITY: 0.76 M/S
ECHO MV AREA PHT: 3.3 CM2
ECHO MV AREA PLAN: 4.7 CM2
ECHO MV E DECELERATION TIME (DT): 230.3 MS
ECHO MV E VELOCITY: 0.34 M/S
ECHO MV E/A RATIO: 0.45
ECHO MV PRESSURE HALF TIME (PHT): 66.8 MS
ECHO PULMONARY ARTERY END DIASTOLIC PRESSURE: 15 MMHG
ECHO PV MAX VELOCITY: 0.7 M/S
ECHO PV PEAK GRADIENT: 2 MMHG
ECHO RIGHT VENTRICULAR SYSTOLIC PRESSURE (RVSP): 32 MMHG
ECHO TV REGURGITANT MAX VELOCITY: 2.61 M/S
ECHO TV REGURGITANT PEAK GRADIENT: 28 MMHG

## 2023-02-14 PROCEDURE — 93306 TTE W/DOPPLER COMPLETE: CPT

## 2023-02-14 PROCEDURE — 93306 TTE W/DOPPLER COMPLETE: CPT | Performed by: SPECIALIST

## 2023-02-16 ENCOUNTER — TELEPHONE (OUTPATIENT)
Dept: CARDIOLOGY CLINIC | Age: 83
End: 2023-02-16

## 2023-02-16 NOTE — PROGRESS NOTES
Heart muscle is strong. One minor valve leakage that is not a worry or anything that would cause symptoms. Looks great.

## 2023-03-09 NOTE — PROGRESS NOTES
Subjective:   Irina Tamayo is a 81 y.o.  female who presents to the clinic today for 2 month follow up. I first met Mrs. Noyola in January of this year, when she was referred to cardiology for peripheral edema. See previous note encounter as follows:    Patient states that for the last month or so she started develop a sensation of bilateral leg heaviness/tightness and swelling. At the time that her symptoms started, she denies any significant lifestyle changes or medication changes. She tried using compression stockings for less than a week, and stopped using them because she did not notice any difference in the swelling. She also states that within the last 1 to 2 weeks she was also started on 20 mg of Lasix daily and was told to discontinue her prescribed HCTZ (12.5 mg). Likewise, she states that these adjustments have not resulted in any significant improvement in her lower extremity edema. She denies a history of similar symptoms. She seems to be in a very good state of health overall and she specifically denies any abnormal fatigue, lightheadedness/dizziness, palpitations, orthopnea, PND, new cough, shortness of breath, chest pains, or decreased exercise tolerance. She lives a fairly sedentary lifestyle and states that she sits with her legs in a dependent position for close to 80% of the day. Perhaps it could be most contributory she states that she has had a bug infestation in her house recently and for the last 3 weeks she has been sleeping in a chair instead of her bed. She has never had any echocardiograms, cardiac stress tests, or cardiac catheterizations. TODAY'S VISIT:  -Prior to meeting me, her HCTZ was discontinued and she was put on 20 mg of Lasix without significant proven or swelling.   I recommended twice daily Lasix for about 5 days and then resuming 20 mg daily to see if she noticed any difference in her swelling.  -Her lower extremity edema has improved significantly with the addition of Lasix, increased frequency with walking, compression stockings, and leg elevation. She feels like the symptoms are no longer a concern to her.  -Echocardiogram ordered which reveals no systolic or diastolic dysfunction and only mild valvular disease, which is noncontributory to lower extremity edema.  -Went to the emergency room on 2/10/2023 for palpitations and was found to be in a narrow complex SVT that terminated with vagal maneuvers. Seems like she has had similar episodes in the past that have resolved spontaneously, and most consistent with PSVT. Interestingly enough, when I met her in January she ended up having a normal pro-BNP. When she was found to be in SVT, her pro-BNP was 1,977 with mild elevated troponin likely due to demand from tachycardia. She was started on diltiazem and has not had any recurrences of palpitations over the last 4 weeks. Primary Care Physician: Herrera Alvarez MD    Tobacco use: Never  Alcohol use: No  Illicit drug use: Never  Occupation: She is now retired. She used to work in a  center. Physical activity: Fairly sedentary lifestyle. States she is in a seated position about 80% of the day. No limitations with normal activities. Cardiac risk factors: sedentary life style, hypertension, post-menopausal.    There is no problem list on file for this patient. 01/23  - Referred to cardiology for lower extremity edema  - I initially saw Mrs. Noyola in January 2023. Started on lasix; HCTZ d/c. Patient spends a lot of time sitting with her legs in dependent position.    -2/23  Venous duplex - No DVT  Echo: ·  Left Ventricle: Normal left ventricular systolic function. EF of 55 - 60%. Left ventricle is smaller than normal. Moderate septal thickening. Normal wall motion. Normal diastolic function. Tricuspid Valve: Mild regurgitation with a centrally directed jet. -2/10/22:  ED visit for palpitations.  Found to have narrow complex tachycardia at 152 beats/min. * Terminated with vagal maneuvers. Elevated pro-BNP at that time; completely normal less than one month before (reactive from tachycardia)   Current Outpatient Medications   Medication Sig Dispense Refill    dilTIAZem ER (Cardizem CD) 120 mg capsule Take 1 Capsule by mouth daily for 30 days. 30 Capsule 0    benazepriL (LOTENSIN) 20 mg tablet Take 1 Tablet by mouth daily. *STOP your Amlodipine-Benazepril 90 Tablet 2    furosemide (LASIX) 20 mg tablet Take 1 tablet twice daily for 5 days; Then take 1 tablet once a day. 30 Tablet 3    levothyroxine (SYNTHROID) 100 mcg tablet Take 100 mcg by mouth Daily (before breakfast). simvastatin (ZOCOR) 20 mg tablet Take  by mouth nightly. No Known Allergies  Past Medical History:   Diagnosis Date    Hypertension     Thyroid disease      No family history on file. Past Surgical History:   Procedure Laterality Date    HX ORTHOPAEDIC      R knee     Social History     Tobacco Use    Smoking status: Never    Smokeless tobacco: Never   Substance Use Topics    Alcohol use: No        No results found for: CHOL, CHOLPOCT, CHOLX, CHLST, CHOLV, TOTCHOLEXT, HDL, HDLPOC, HDLEXT, HDLP, LDL, LDLCPOC, LDLCEXT, LDLC, DLDLP, VLDLC, VLDL, TGLX, TRIGL, TRIGLYCEXT, TRIGP, TGLPOCT, CHHD, CHHDX     No results found for: HBA1C, IUP0DAIE, UQB5NEHF, JQG5HSBL     Lab Results   Component Value Date/Time    Sodium 141 02/10/2023 01:54 AM    Potassium 3.8 02/10/2023 01:54 AM    Chloride 106 02/10/2023 01:54 AM    CO2 28 02/10/2023 01:54 AM    Anion gap 7 02/10/2023 01:54 AM    Glucose 128 (H) 02/10/2023 01:54 AM    BUN 15 02/10/2023 01:54 AM    Creatinine 0.76 02/10/2023 01:54 AM    BUN/Creatinine ratio 20 02/10/2023 01:54 AM    Calcium 8.8 02/10/2023 01:54 AM    Bilirubin, total 0.7 02/10/2023 01:54 AM    Alk.  phosphatase 76 02/10/2023 01:54 AM    Protein, total 6.7 02/10/2023 01:54 AM    Albumin 3.4 (L) 02/10/2023 01:54 AM    Globulin 3.3 02/10/2023 01:54 AM    A-G Ratio 1.0 (L) 02/10/2023 01:54 AM    ALT (SGPT) 21 02/10/2023 01:54 AM    AST (SGOT) 20 02/10/2023 01:54 AM        Lab Results   Component Value Date/Time    WBC 7.9 02/10/2023 01:54 AM    HGB 13.5 02/10/2023 01:54 AM    HCT 40.3 02/10/2023 01:54 AM    PLATELET 864 95/12/2345 01:54 AM    MCV 89.2 02/10/2023 01:54 AM        There were no vitals taken for this visit. There is no height or weight on file to calculate BMI. ROS     Physical Exam     EKG: Normal sinus rhythm. No ectopy. Normal axis and intervals. No ischemic abnormalities. Assessment/Plan:   1. Heart murmur  -Benign echocardiogram.    2. Peripheral Edema  -Likely not cardiac induced. Normal systolic / diastolic function. No significant valvular disease.  -Improved significantly, likely with combination of low-dose furosemide, increased ambulation, leg elevation, and compression stockings. 3. PSVT  -Rate constant and 152 bpm which was terminated with vagal maneuvers, favoring AVRT/AVNRT over 2-1 flutter.  -Resting heart rate around 69 to 72 bpm.  No adverse side effects of current diltiazem dose. -Uptitrate diltiazem from 120 mg daily to 180 mg daily.  -Patient given written instructions for how to perform vagal maneuvers to try and terminate palpitations if they recur. She was also given instructions for what would warrant going to the emergency room (symptoms not terminated with vagal maneuvers, diaphoresis, feeling very faint, chest pain). 4. Hypertension  -Continue ACE-I at same dose.  -Slight up titration of diltiazem might help a little bit with BP control. Given her age, I do not want to be too aggressive but I would like to get her systolic BP below 602. I will reassess during her next appointment, which will be in approximately 6 months. Prior to this visit I have reviewed key aspects of the patient's medical record to optimize medical decision making.  This includes, but is not limited to, prior office visits and emergency room encounters (if applicable). I have reviewed pertinent diagnostic test results (when available), vital signs, weights & and ambulatory blood pressure readings (when available), as well as personal and family medical history to develop an individualized care plan. I have spent time discussing both pharmacological and nonpharmacological treatment and preventative care measures for optimizing cardiovascular health and wellness. This includes, but is not limited to: obtaining regular physical activity as tolerated, achieving ideal weight and blood pressure, healthy eating habits, smoking cessation, limiting or eliminating alcohol intake, and avoidance of recreational drug use. I have emphasized the importance of adherence to the current medication regimen as well as routine follow up for preventative care measures.       Ron Hernández, SELMA  [address] [facility] Cardiology at [address], [address], [facility] [address] [place] [address] St: [phone]  F: [phone]

## 2023-03-10 ENCOUNTER — OFFICE VISIT (OUTPATIENT)
Dept: CARDIOLOGY CLINIC | Age: 83
End: 2023-03-10

## 2023-03-10 VITALS
RESPIRATION RATE: 18 BRPM | OXYGEN SATURATION: 96 % | BODY MASS INDEX: 28.89 KG/M2 | SYSTOLIC BLOOD PRESSURE: 152 MMHG | HEART RATE: 70 BPM | WEIGHT: 157 LBS | DIASTOLIC BLOOD PRESSURE: 83 MMHG | HEIGHT: 62 IN

## 2023-03-10 DIAGNOSIS — Z86.79 HISTORY OF PSVT (PAROXYSMAL SUPRAVENTRICULAR TACHYCARDIA): Primary | ICD-10-CM

## 2023-03-10 DIAGNOSIS — R60.9 PERIPHERAL EDEMA: ICD-10-CM

## 2023-03-10 DIAGNOSIS — I10 HYPERTENSION, UNSPECIFIED TYPE: ICD-10-CM

## 2023-03-10 RX ORDER — DILTIAZEM HYDROCHLORIDE 180 MG/1
180 CAPSULE, COATED, EXTENDED RELEASE ORAL DAILY
Qty: 90 CAPSULE | Refills: 1 | Status: SHIPPED | OUTPATIENT
Start: 2023-03-10

## 2023-03-10 NOTE — PATIENT INSTRUCTIONS
You were diagnosed with an abnormal heart rhythm called paroxysmal supraventricular tachycardia (PSVT). You have started a medication called diltiazem to help you prevent from going into this abnormal heart rhythm again. If you start to feel a rapid heartbeat consistent with you had in the past, it very well could be another episode of PSVT. Sometimes you can get the symptoms to go away by doing the following:    Bearing down as hard as you can like you are having a bowel movement for approximately 10 seconds. This might be more effective if you are laying down and have your feet elevated. Splash really cold water onto your face. Or blow on your thumb forcefully like you are trying to inflate a balloon. If your symptoms resolve with any of the above measures, you do not need to seek any additional medical care. If your symptoms do not resolve, or associated with any significant chest pain, sweating, or faintness, you should go to the emergency room to be reevaluated.

## 2023-09-10 NOTE — PROGRESS NOTES
space. No radiation to neck. 2+ carotid pulse. Brisk upstroke. Mild nonpitting edema without evidence of any skin wound/ulceration. Skin does feel diffusely warm and well-perfused. Pulmonary:      Effort: Pulmonary effort is normal. No tachypnea. Breath sounds: Normal breath sounds. Abdominal:      General: There is no distension. Tenderness: There is no abdominal tenderness. Neurological:      Mental Status: She is alert. Psychiatric:         Attention and Perception: Attention and perception normal.         Review of Systems      EKG: Normal sinus rhythm. No ectopy. Normal axis and intervals. No ischemic abnormalities. Assessment/Plan:   1. Heart murmur  -Benign echocardiogram.    2. Peripheral Edema  -Likely not cardiac induced. Normal systolic / diastolic function. No significant valvular disease.  -Improved significantly, likely with combination of low-dose furosemide, increased ambulation, leg elevation, and compression stockings. 3. PSVT  -Rate constant and 152 bpm which was terminated with vagal maneuvers, favoring AVRT/AVNRT over 2-1 flutter.  -Resting heart rate around 69 to 72 bpm.  No adverse side effects of current diltiazem dose. -Continue diltiazem 180 mg daily.  -Patient given written instructions for how to perform vagal maneuvers to try and terminate palpitations if they recur. She was also given instructions for what would warrant going to the emergency room (symptoms not terminated with vagal maneuvers, diaphoresis, feeling very faint, chest pain). 4. Hypertension  -Goal BP < 140/90  -Continue ACE-I and Diltiazem at current dose. Ms. Claudell Husky seems to be doing extremely well and her quality of life is excellent. I advised yearly follow-ups or sooner as needed for any new or worsening symptoms. Prior to this visit I have reviewed key aspects of the patient's medical record to optimize medical decision making.  This includes, but is not limited to,

## 2023-09-11 ENCOUNTER — OFFICE VISIT (OUTPATIENT)
Age: 83
End: 2023-09-11

## 2023-09-11 VITALS
BODY MASS INDEX: 28.34 KG/M2 | DIASTOLIC BLOOD PRESSURE: 82 MMHG | HEART RATE: 65 BPM | WEIGHT: 154 LBS | SYSTOLIC BLOOD PRESSURE: 130 MMHG | OXYGEN SATURATION: 97 % | HEIGHT: 62 IN

## 2023-09-11 DIAGNOSIS — I47.1 PSVT (PAROXYSMAL SUPRAVENTRICULAR TACHYCARDIA) (HCC): ICD-10-CM

## 2023-09-11 DIAGNOSIS — R60.9 PERIPHERAL EDEMA: ICD-10-CM

## 2023-09-11 DIAGNOSIS — I10 ESSENTIAL (PRIMARY) HYPERTENSION: ICD-10-CM

## 2023-09-11 DIAGNOSIS — R01.1 HEART MURMUR: Primary | ICD-10-CM

## 2023-09-11 PROCEDURE — 3075F SYST BP GE 130 - 139MM HG: CPT | Performed by: NURSE PRACTITIONER

## 2023-09-11 PROCEDURE — 3079F DIAST BP 80-89 MM HG: CPT | Performed by: NURSE PRACTITIONER

## 2023-09-11 PROCEDURE — 99213 OFFICE O/P EST LOW 20 MIN: CPT | Performed by: NURSE PRACTITIONER

## 2023-09-11 PROCEDURE — 1123F ACP DISCUSS/DSCN MKR DOCD: CPT | Performed by: NURSE PRACTITIONER

## 2023-09-11 RX ORDER — LEVOTHYROXINE SODIUM 88 UG/1
88 TABLET ORAL DAILY
COMMUNITY
Start: 2023-06-10

## 2023-09-11 RX ORDER — HYDROCHLOROTHIAZIDE 12.5 MG/1
CAPSULE, GELATIN COATED ORAL DAILY
COMMUNITY
Start: 2023-06-09

## 2023-09-11 RX ORDER — DILTIAZEM HYDROCHLORIDE 180 MG/1
180 CAPSULE, EXTENDED RELEASE ORAL DAILY
Qty: 90 CAPSULE | Refills: 3 | Status: SHIPPED | OUTPATIENT
Start: 2023-09-11

## 2023-09-11 ASSESSMENT — PATIENT HEALTH QUESTIONNAIRE - PHQ9
2. FEELING DOWN, DEPRESSED OR HOPELESS: 0
1. LITTLE INTEREST OR PLEASURE IN DOING THINGS: 0
SUM OF ALL RESPONSES TO PHQ QUESTIONS 1-9: 0
SUM OF ALL RESPONSES TO PHQ QUESTIONS 1-9: 0
SUM OF ALL RESPONSES TO PHQ9 QUESTIONS 1 & 2: 0
SUM OF ALL RESPONSES TO PHQ QUESTIONS 1-9: 0
SUM OF ALL RESPONSES TO PHQ QUESTIONS 1-9: 0

## 2023-09-11 ASSESSMENT — VISUAL ACUITY: OU: 1

## 2023-12-23 ENCOUNTER — HOSPITAL ENCOUNTER (EMERGENCY)
Facility: HOSPITAL | Age: 83
Discharge: HOME | End: 2023-12-23
Attending: STUDENT IN AN ORGANIZED HEALTH CARE EDUCATION/TRAINING PROGRAM
Payer: MEDICARE

## 2023-12-23 ENCOUNTER — APPOINTMENT (OUTPATIENT)
Facility: HOSPITAL | Age: 83
End: 2023-12-23
Payer: MEDICARE

## 2023-12-23 VITALS
RESPIRATION RATE: 16 BRPM | OXYGEN SATURATION: 96 % | WEIGHT: 176.3 LBS | TEMPERATURE: 98.1 F | SYSTOLIC BLOOD PRESSURE: 102 MMHG | BODY MASS INDEX: 32.25 KG/M2 | DIASTOLIC BLOOD PRESSURE: 56 MMHG | HEART RATE: 67 BPM

## 2023-12-23 DIAGNOSIS — U07.1 2019-NCOV DETECTED: ICD-10-CM

## 2023-12-23 DIAGNOSIS — I47.10 PAROXYSMAL SUPRAVENTRICULAR TACHYCARDIA: Primary | ICD-10-CM

## 2023-12-23 DIAGNOSIS — E86.0 DEHYDRATION: ICD-10-CM

## 2023-12-23 LAB
ALBUMIN SERPL-MCNC: 3.5 G/DL (ref 3.5–5)
ALBUMIN/GLOB SERPL: 1 (ref 1.1–2.2)
ALP SERPL-CCNC: 75 U/L (ref 45–117)
ALT SERPL-CCNC: 20 U/L (ref 12–78)
ANION GAP BLD CALC-SCNC: 9 (ref 10–20)
ANION GAP SERPL CALC-SCNC: 11 MMOL/L (ref 5–15)
APPEARANCE UR: CLEAR
AST SERPL-CCNC: 20 U/L (ref 15–37)
BACTERIA URNS QL MICRO: NEGATIVE /HPF
BASOPHILS # BLD: 0 K/UL (ref 0–0.1)
BASOPHILS NFR BLD: 0 % (ref 0–1)
BILIRUB SERPL-MCNC: 0.6 MG/DL (ref 0.2–1)
BILIRUB UR QL: NEGATIVE
BUN SERPL-MCNC: 18 MG/DL (ref 6–20)
BUN/CREAT SERPL: 22 (ref 12–20)
CA-I BLD-MCNC: 1.16 MMOL/L (ref 1.12–1.32)
CALCIUM SERPL-MCNC: 9.1 MG/DL (ref 8.5–10.1)
CHLORIDE BLD-SCNC: 104 MMOL/L (ref 100–108)
CHLORIDE SERPL-SCNC: 102 MMOL/L (ref 97–108)
CO2 BLD-SCNC: 25 MMOL/L (ref 19–24)
CO2 SERPL-SCNC: 25 MMOL/L (ref 21–32)
COLOR UR: ABNORMAL
CREAT SERPL-MCNC: 0.81 MG/DL (ref 0.55–1.02)
CREAT UR-MCNC: 0.7 MG/DL (ref 0.6–1.3)
DIFFERENTIAL METHOD BLD: ABNORMAL
EOSINOPHIL # BLD: 0.1 K/UL (ref 0–0.4)
EOSINOPHIL NFR BLD: 1 % (ref 0–7)
EPITH CASTS URNS QL MICRO: ABNORMAL /LPF
ERYTHROCYTE [DISTWIDTH] IN BLOOD BY AUTOMATED COUNT: 13.2 % (ref 11.5–14.5)
GLOBULIN SER CALC-MCNC: 3.6 G/DL (ref 2–4)
GLUCOSE BLD STRIP.AUTO-MCNC: 106 MG/DL (ref 74–106)
GLUCOSE SERPL-MCNC: 107 MG/DL (ref 65–100)
GLUCOSE UR STRIP.AUTO-MCNC: NEGATIVE MG/DL
HCT VFR BLD AUTO: 42.7 % (ref 35–47)
HGB BLD-MCNC: 14.4 G/DL (ref 11.5–16)
HGB UR QL STRIP: NEGATIVE
IMM GRANULOCYTES # BLD AUTO: 0 K/UL (ref 0–0.04)
IMM GRANULOCYTES NFR BLD AUTO: 0 % (ref 0–0.5)
KETONES UR QL STRIP.AUTO: ABNORMAL MG/DL
LACTATE BLD-SCNC: 1.71 MMOL/L (ref 0.4–2)
LEUKOCYTE ESTERASE UR QL STRIP.AUTO: ABNORMAL
LYMPHOCYTES # BLD: 1.9 K/UL (ref 0.8–3.5)
LYMPHOCYTES NFR BLD: 24 % (ref 12–49)
MCH RBC QN AUTO: 30 PG (ref 26–34)
MCHC RBC AUTO-ENTMCNC: 33.7 G/DL (ref 30–36.5)
MCV RBC AUTO: 89 FL (ref 80–99)
MONOCYTES # BLD: 1.3 K/UL (ref 0–1)
MONOCYTES NFR BLD: 17 % (ref 5–13)
NEUTS SEG # BLD: 4.4 K/UL (ref 1.8–8)
NEUTS SEG NFR BLD: 58 % (ref 32–75)
NITRITE UR QL STRIP.AUTO: NEGATIVE
NRBC # BLD: 0 K/UL (ref 0–0.01)
NRBC BLD-RTO: 0 PER 100 WBC
PH UR STRIP: 6 (ref 5–8)
PLATELET # BLD AUTO: 192 K/UL (ref 150–400)
PMV BLD AUTO: 10.4 FL (ref 8.9–12.9)
POTASSIUM BLD-SCNC: 4.2 MMOL/L (ref 3.5–5.5)
POTASSIUM SERPL-SCNC: 3.3 MMOL/L (ref 3.5–5.1)
PROCALCITONIN SERPL-MCNC: <0.05 NG/ML
PROT SERPL-MCNC: 7.1 G/DL (ref 6.4–8.2)
PROT UR STRIP-MCNC: NEGATIVE MG/DL
RBC # BLD AUTO: 4.8 M/UL (ref 3.8–5.2)
RBC #/AREA URNS HPF: ABNORMAL /HPF (ref 0–5)
SERVICE CMNT-IMP: ABNORMAL
SODIUM BLD-SCNC: 138 MMOL/L (ref 136–145)
SODIUM SERPL-SCNC: 138 MMOL/L (ref 136–145)
SP GR UR REFRACTOMETRY: 1.01
SPECIMEN SITE: ABNORMAL
TROPONIN I SERPL HS-MCNC: 38 NG/L (ref 0–51)
URINE CULTURE IF INDICATED: ABNORMAL
UROBILINOGEN UR QL STRIP.AUTO: 1 EU/DL (ref 0.2–1)
WBC # BLD AUTO: 7.7 K/UL (ref 3.6–11)
WBC URNS QL MICRO: ABNORMAL /HPF (ref 0–4)

## 2023-12-23 PROCEDURE — 6360000002 HC RX W HCPCS: Performed by: STUDENT IN AN ORGANIZED HEALTH CARE EDUCATION/TRAINING PROGRAM

## 2023-12-23 PROCEDURE — 84145 PROCALCITONIN (PCT): CPT

## 2023-12-23 PROCEDURE — 96374 THER/PROPH/DIAG INJ IV PUSH: CPT

## 2023-12-23 PROCEDURE — 99285 EMERGENCY DEPT VISIT HI MDM: CPT

## 2023-12-23 PROCEDURE — 87040 BLOOD CULTURE FOR BACTERIA: CPT

## 2023-12-23 PROCEDURE — 36415 COLL VENOUS BLD VENIPUNCTURE: CPT

## 2023-12-23 PROCEDURE — 80047 BASIC METABLC PNL IONIZED CA: CPT

## 2023-12-23 PROCEDURE — 83605 ASSAY OF LACTIC ACID: CPT

## 2023-12-23 PROCEDURE — 2580000003 HC RX 258: Performed by: STUDENT IN AN ORGANIZED HEALTH CARE EDUCATION/TRAINING PROGRAM

## 2023-12-23 PROCEDURE — 80053 COMPREHEN METABOLIC PANEL: CPT

## 2023-12-23 PROCEDURE — 71045 X-RAY EXAM CHEST 1 VIEW: CPT

## 2023-12-23 PROCEDURE — 96361 HYDRATE IV INFUSION ADD-ON: CPT

## 2023-12-23 PROCEDURE — 84484 ASSAY OF TROPONIN QUANT: CPT

## 2023-12-23 PROCEDURE — 81001 URINALYSIS AUTO W/SCOPE: CPT

## 2023-12-23 PROCEDURE — 85025 COMPLETE CBC W/AUTO DIFF WBC: CPT

## 2023-12-23 PROCEDURE — 93005 ELECTROCARDIOGRAM TRACING: CPT | Performed by: STUDENT IN AN ORGANIZED HEALTH CARE EDUCATION/TRAINING PROGRAM

## 2023-12-23 RX ORDER — ONDANSETRON 2 MG/ML
4 INJECTION INTRAMUSCULAR; INTRAVENOUS ONCE
Status: COMPLETED | OUTPATIENT
Start: 2023-12-23 | End: 2023-12-23

## 2023-12-23 RX ORDER — 0.9 % SODIUM CHLORIDE 0.9 %
1000 INTRAVENOUS SOLUTION INTRAVENOUS ONCE
Status: COMPLETED | OUTPATIENT
Start: 2023-12-23 | End: 2023-12-23

## 2023-12-23 RX ADMIN — SODIUM CHLORIDE 1000 ML: 9 INJECTION, SOLUTION INTRAVENOUS at 14:29

## 2023-12-23 RX ADMIN — SODIUM CHLORIDE 1000 ML: 9 INJECTION, SOLUTION INTRAVENOUS at 12:23

## 2023-12-23 RX ADMIN — ONDANSETRON 4 MG: 2 INJECTION INTRAMUSCULAR; INTRAVENOUS at 12:24

## 2023-12-23 NOTE — ED PROVIDER NOTES
cervical adenopathy. Skin:     General: Skin is warm and dry. Capillary Refill: Capillary refill takes less than 2 seconds. Neurological:      General: No focal deficit present. Mental Status: She is alert and oriented to person, place, and time. DIAGNOSTIC RESULTS   LABS:     Recent Results (from the past 24 hour(s))   EKG 12 Lead    Collection Time: 12/23/23 11:53 AM   Result Value Ref Range    Ventricular Rate 156 BPM    Atrial Rate 82 BPM    QRS Duration 82 ms    Q-T Interval 292 ms    QTc Calculation (Bazett) 470 ms    R Axis 50 degrees    T Axis -69 degrees    Diagnosis       Supraventricular tachycardia  ST & T wave abnormality, consider inferior ischemia  Abnormal ECG  When compared with ECG of 10-FEB-2023 01:50,  Vent. rate has increased BY  73 BPM  ST now depressed in Inferior leads  ST now depressed in Anterolateral leads  T wave inversion now evident in Inferior leads  Nonspecific T wave abnormality now evident in Anterolateral leads             EKG: SVT with a heart rate of 156, regular, narrow complex tachycardia, difficult to appreciate ST segment secondary to the heart rate. 2nd EKG  Heart rate of 57 bpm, narrow complex QRS, unable to evaluate for P wave, suspect junctional rhythm versus short [place] segment, no ST elevations concerning for STEMI.       RADIOLOGY:  Non-plain film images such as CT, Ultrasound and MRI are read by the radiologist. Plain radiographic images are visualized and preliminarily interpreted by the ED Provider with the below findings:          Interpretation per the Radiologist below, if available at the time of this note:     XR CHEST PORTABLE    (Results Pending)           PROCEDURES   Unless otherwise noted below, none  Procedures       CRITICAL CARE TIME   ***    SCREENINGS   NIH Stroke Score       Heart Score       Curb-65          EMERGENCY DEPARTMENT COURSE and DIFFERENTIAL DIAGNOSIS/MDM   Vitals:    Vitals:    12/23/23 1109   BP: 93/80   Pulse: otherwise unremarkable, UA has not resulted. No signs of UTI. Blood pressure has been stable. No recurrence of her SVT. Per chart review patient does have a history of SVT managed by diltiazem. Will ensure that she has taken her medications today. We will encourage her to continue taking her medications as prescribed. Will discharge home with instructions for oral rehydration, follow-up over the next 3 days for reevaluation of her symptoms. We also discussed return precautions. [DT]      ED Course User Index  [DT] Mary Santiago MD         FINAL IMPRESSION     1. Paroxysmal supraventricular tachycardia    2. Dehydration    3. 2019-nCoV detected          DISPOSITION/PLAN   DISPOSITION        Discharge Note: The patient is stable for discharge home. The signs, symptoms, diagnosis, and discharge instructions have been discussed, understanding conveyed, and agreed upon. The patient is to follow up as recommended or return to ER should their symptoms worsen. PATIENT REFERRED TO:  Mark Morales MD  [address]  [address] [facility] [address]  [phone]    Go in 3 days           DISCHARGE MEDICATIONS:     Medication List        ASK your doctor about these medications      benazepril 20 MG tablet  Commonly known as: LOTENSIN     dilTIAZem 180 MG extended release capsule  Commonly known as: TIAZAC  Take 1 capsule by mouth daily     furosemide 20 MG tablet  Commonly known as: LASIX     hydroCHLOROthiazide 12.5 MG capsule  Commonly known as: MICROZIDE     * levothyroxine 100 MCG tablet  Commonly known as: SYNTHROID     * levothyroxine 88 MCG tablet  Commonly known as: SYNTHROID     simvastatin 20 MG tablet  Commonly known as: ZOCOR           * This list has 2 medication(s) that are the same as other medications prescribed for you. Read the directions carefully, and ask your doctor or other care provider to review them with you.                     DISCONTINUED MEDICATIONS:  Current Discharge Medication

## 2023-12-23 NOTE — ED NOTES
Patient (s)  given copy of dc instructions and 0 script(s). Patient (s)  verbalized understanding of instructions and script (s). Patient given a current medication reconciliation form and verbalized understanding of their medications. Patient (s) verbalized understanding of the importance of discussing medications with his or her physician or clinic they will be following up with. Patient alert and oriented and in no acute distress. Patient discharged home, patient offered wheelchair, patient given wheelchair to main entrance, assisted into car.

## 2023-12-26 LAB
EKG ATRIAL RATE: 82 BPM
EKG DIAGNOSIS: NORMAL
EKG Q-T INTERVAL: 292 MS
EKG QRS DURATION: 82 MS
EKG QTC CALCULATION (BAZETT): 470 MS
EKG R AXIS: 50 DEGREES
EKG T AXIS: -69 DEGREES
EKG VENTRICULAR RATE: 156 BPM

## 2023-12-26 PROCEDURE — 93010 ELECTROCARDIOGRAM REPORT: CPT | Performed by: SPECIALIST

## 2023-12-28 LAB
BACTERIA SPEC CULT: NORMAL
BACTERIA SPEC CULT: NORMAL
SERVICE CMNT-IMP: NORMAL
SERVICE CMNT-IMP: NORMAL

## 2024-03-08 ENCOUNTER — HOSPITAL ENCOUNTER (INPATIENT)
Facility: HOSPITAL | Age: 84
LOS: 3 days | Discharge: HOME | DRG: 871 | End: 2024-03-11
Attending: STUDENT IN AN ORGANIZED HEALTH CARE EDUCATION/TRAINING PROGRAM | Admitting: STUDENT IN AN ORGANIZED HEALTH CARE EDUCATION/TRAINING PROGRAM
Payer: MEDICARE

## 2024-03-08 ENCOUNTER — APPOINTMENT (OUTPATIENT)
Facility: HOSPITAL | Age: 84
DRG: 871 | End: 2024-03-08
Payer: MEDICARE

## 2024-03-08 DIAGNOSIS — R26.2 AMBULATORY DYSFUNCTION: Primary | ICD-10-CM

## 2024-03-08 DIAGNOSIS — R50.9 FEBRILE ILLNESS: ICD-10-CM

## 2024-03-08 PROBLEM — A41.9 SEVERE SEPSIS (HCC): Status: ACTIVE | Noted: 2024-03-08

## 2024-03-08 PROBLEM — R65.20 SEVERE SEPSIS (HCC): Status: ACTIVE | Noted: 2024-03-08

## 2024-03-08 LAB
ALBUMIN SERPL-MCNC: 3.3 G/DL (ref 3.5–5)
ALBUMIN/GLOB SERPL: 0.8 (ref 1.1–2.2)
ALP SERPL-CCNC: 84 U/L (ref 45–117)
ALT SERPL-CCNC: 33 U/L (ref 12–78)
ANION GAP SERPL CALC-SCNC: 8 MMOL/L (ref 5–15)
APPEARANCE UR: CLEAR
AST SERPL-CCNC: 52 U/L (ref 15–37)
BACTERIA URNS QL MICRO: NEGATIVE /HPF
BASOPHILS # BLD: 0 K/UL (ref 0–0.1)
BASOPHILS NFR BLD: 0 % (ref 0–1)
BILIRUB SERPL-MCNC: 0.8 MG/DL (ref 0.2–1)
BILIRUB UR QL: NEGATIVE
BUN SERPL-MCNC: 15 MG/DL (ref 6–20)
BUN/CREAT SERPL: 15 (ref 12–20)
CALCIUM SERPL-MCNC: 9.5 MG/DL (ref 8.5–10.1)
CHLORIDE SERPL-SCNC: 104 MMOL/L (ref 97–108)
CO2 SERPL-SCNC: 19 MMOL/L (ref 21–32)
COLOR UR: ABNORMAL
CREAT SERPL-MCNC: 1.01 MG/DL (ref 0.55–1.02)
DIFFERENTIAL METHOD BLD: ABNORMAL
EOSINOPHIL # BLD: 0.7 K/UL (ref 0–0.4)
EOSINOPHIL NFR BLD: 8 % (ref 0–7)
EPITH CASTS URNS QL MICRO: ABNORMAL /LPF
ERYTHROCYTE [DISTWIDTH] IN BLOOD BY AUTOMATED COUNT: 13.2 % (ref 11.5–14.5)
FLUAV AG NPH QL IA: NEGATIVE
FLUBV AG NOSE QL IA: NEGATIVE
GLOBULIN SER CALC-MCNC: 4.2 G/DL (ref 2–4)
GLUCOSE SERPL-MCNC: 134 MG/DL (ref 65–100)
GLUCOSE UR STRIP.AUTO-MCNC: NEGATIVE MG/DL
HCT VFR BLD AUTO: 40.6 % (ref 35–47)
HGB BLD-MCNC: 13.7 G/DL (ref 11.5–16)
HGB UR QL STRIP: NEGATIVE
IMM GRANULOCYTES # BLD AUTO: 0.2 K/UL (ref 0–0.04)
IMM GRANULOCYTES NFR BLD AUTO: 2 % (ref 0–0.5)
KETONES UR QL STRIP.AUTO: ABNORMAL MG/DL
LACTATE SERPL-SCNC: 1.9 MMOL/L (ref 0.4–2)
LEUKOCYTE ESTERASE UR QL STRIP.AUTO: ABNORMAL
LYMPHOCYTES # BLD: 0.4 K/UL (ref 0.8–3.5)
LYMPHOCYTES NFR BLD: 5 % (ref 12–49)
MCH RBC QN AUTO: 29.8 PG (ref 26–34)
MCHC RBC AUTO-ENTMCNC: 33.7 G/DL (ref 30–36.5)
MCV RBC AUTO: 88.5 FL (ref 80–99)
MONOCYTES # BLD: 0.6 K/UL (ref 0–1)
MONOCYTES NFR BLD: 7 % (ref 5–13)
NEUTS SEG # BLD: 6.5 K/UL (ref 1.8–8)
NEUTS SEG NFR BLD: 78 % (ref 32–75)
NITRITE UR QL STRIP.AUTO: NEGATIVE
NRBC # BLD: 0 K/UL (ref 0–0.01)
NRBC BLD-RTO: 0 PER 100 WBC
PH UR STRIP: 6 (ref 5–8)
PLATELET # BLD AUTO: 188 K/UL (ref 150–400)
POTASSIUM SERPL-SCNC: 4.9 MMOL/L (ref 3.5–5.1)
PROT SERPL-MCNC: 7.5 G/DL (ref 6.4–8.2)
PROT UR STRIP-MCNC: ABNORMAL MG/DL
RBC # BLD AUTO: 4.59 M/UL (ref 3.8–5.2)
RBC #/AREA URNS HPF: ABNORMAL /HPF (ref 0–5)
RBC MORPH BLD: ABNORMAL
SARS-COV-2 RDRP RESP QL NAA+PROBE: NOT DETECTED
SODIUM SERPL-SCNC: 131 MMOL/L (ref 136–145)
SOURCE: NORMAL
SP GR UR REFRACTOMETRY: 1.03
URINE CULTURE IF INDICATED: ABNORMAL
UROBILINOGEN UR QL STRIP.AUTO: 1 EU/DL (ref 0.2–1)
WBC # BLD AUTO: 8.4 K/UL (ref 3.6–11)
WBC URNS QL MICRO: ABNORMAL /HPF (ref 0–4)

## 2024-03-08 PROCEDURE — 96365 THER/PROPH/DIAG IV INF INIT: CPT

## 2024-03-08 PROCEDURE — 87804 INFLUENZA ASSAY W/OPTIC: CPT

## 2024-03-08 PROCEDURE — 81001 URINALYSIS AUTO W/SCOPE: CPT

## 2024-03-08 PROCEDURE — 2580000003 HC RX 258: Performed by: STUDENT IN AN ORGANIZED HEALTH CARE EDUCATION/TRAINING PROGRAM

## 2024-03-08 PROCEDURE — 99285 EMERGENCY DEPT VISIT HI MDM: CPT

## 2024-03-08 PROCEDURE — 82607 VITAMIN B-12: CPT

## 2024-03-08 PROCEDURE — 84439 ASSAY OF FREE THYROXINE: CPT

## 2024-03-08 PROCEDURE — 6360000002 HC RX W HCPCS: Performed by: PHYSICIAN ASSISTANT

## 2024-03-08 PROCEDURE — 70450 CT HEAD/BRAIN W/O DYE: CPT

## 2024-03-08 PROCEDURE — 85025 COMPLETE CBC W/AUTO DIFF WBC: CPT

## 2024-03-08 PROCEDURE — 1100000003 HC PRIVATE W/ TELEMETRY

## 2024-03-08 PROCEDURE — 36415 COLL VENOUS BLD VENIPUNCTURE: CPT

## 2024-03-08 PROCEDURE — 83605 ASSAY OF LACTIC ACID: CPT

## 2024-03-08 PROCEDURE — 71046 X-RAY EXAM CHEST 2 VIEWS: CPT

## 2024-03-08 PROCEDURE — 80053 COMPREHEN METABOLIC PANEL: CPT

## 2024-03-08 PROCEDURE — 2580000003 HC RX 258: Performed by: PHYSICIAN ASSISTANT

## 2024-03-08 PROCEDURE — 6370000000 HC RX 637 (ALT 250 FOR IP): Performed by: PHYSICIAN ASSISTANT

## 2024-03-08 PROCEDURE — 84443 ASSAY THYROID STIM HORMONE: CPT

## 2024-03-08 PROCEDURE — 87635 SARS-COV-2 COVID-19 AMP PRB: CPT

## 2024-03-08 RX ORDER — SODIUM CHLORIDE 0.9 % (FLUSH) 0.9 %
5-40 SYRINGE (ML) INJECTION PRN
Status: DISCONTINUED | OUTPATIENT
Start: 2024-03-08 | End: 2024-03-11 | Stop reason: HOSPADM

## 2024-03-08 RX ORDER — LEVOTHYROXINE SODIUM 88 UG/1
88 TABLET ORAL DAILY
Status: DISCONTINUED | OUTPATIENT
Start: 2024-03-09 | End: 2024-03-11 | Stop reason: HOSPADM

## 2024-03-08 RX ORDER — ACETAMINOPHEN 325 MG/1
650 TABLET ORAL EVERY 6 HOURS PRN
Status: DISCONTINUED | OUTPATIENT
Start: 2024-03-08 | End: 2024-03-11 | Stop reason: HOSPADM

## 2024-03-08 RX ORDER — ACETAMINOPHEN 325 MG/1
650 TABLET ORAL EVERY 4 HOURS PRN
Status: DISCONTINUED | OUTPATIENT
Start: 2024-03-08 | End: 2024-03-09

## 2024-03-08 RX ORDER — DILTIAZEM HYDROCHLORIDE 180 MG/1
180 CAPSULE, COATED, EXTENDED RELEASE ORAL DAILY
Status: DISCONTINUED | OUTPATIENT
Start: 2024-03-09 | End: 2024-03-11 | Stop reason: HOSPADM

## 2024-03-08 RX ORDER — 0.9 % SODIUM CHLORIDE 0.9 %
30 INTRAVENOUS SOLUTION INTRAVENOUS ONCE
Status: COMPLETED | OUTPATIENT
Start: 2024-03-08 | End: 2024-03-08

## 2024-03-08 RX ORDER — ACETAMINOPHEN 325 MG/1
650 TABLET ORAL
Status: COMPLETED | OUTPATIENT
Start: 2024-03-08 | End: 2024-03-08

## 2024-03-08 RX ORDER — ACETAMINOPHEN 650 MG/1
650 SUPPOSITORY RECTAL EVERY 6 HOURS PRN
Status: DISCONTINUED | OUTPATIENT
Start: 2024-03-08 | End: 2024-03-11 | Stop reason: HOSPADM

## 2024-03-08 RX ORDER — SODIUM CHLORIDE 0.9 % (FLUSH) 0.9 %
5-40 SYRINGE (ML) INJECTION EVERY 12 HOURS SCHEDULED
Status: DISCONTINUED | OUTPATIENT
Start: 2024-03-08 | End: 2024-03-11 | Stop reason: HOSPADM

## 2024-03-08 RX ORDER — SODIUM CHLORIDE 9 MG/ML
INJECTION, SOLUTION INTRAVENOUS PRN
Status: DISCONTINUED | OUTPATIENT
Start: 2024-03-08 | End: 2024-03-11 | Stop reason: HOSPADM

## 2024-03-08 RX ORDER — ENOXAPARIN SODIUM 100 MG/ML
40 INJECTION SUBCUTANEOUS DAILY
Status: DISCONTINUED | OUTPATIENT
Start: 2024-03-09 | End: 2024-03-11 | Stop reason: HOSPADM

## 2024-03-08 RX ORDER — SODIUM CHLORIDE 9 MG/ML
INJECTION, SOLUTION INTRAVENOUS CONTINUOUS
Status: ACTIVE | OUTPATIENT
Start: 2024-03-08 | End: 2024-03-10

## 2024-03-08 RX ORDER — POLYETHYLENE GLYCOL 3350 17 G/17G
17 POWDER, FOR SOLUTION ORAL DAILY PRN
Status: DISCONTINUED | OUTPATIENT
Start: 2024-03-08 | End: 2024-03-11 | Stop reason: HOSPADM

## 2024-03-08 RX ORDER — ONDANSETRON 2 MG/ML
4 INJECTION INTRAMUSCULAR; INTRAVENOUS EVERY 6 HOURS PRN
Status: DISCONTINUED | OUTPATIENT
Start: 2024-03-08 | End: 2024-03-11 | Stop reason: HOSPADM

## 2024-03-08 RX ORDER — ONDANSETRON 4 MG/1
4 TABLET, ORALLY DISINTEGRATING ORAL EVERY 8 HOURS PRN
Status: DISCONTINUED | OUTPATIENT
Start: 2024-03-08 | End: 2024-03-11 | Stop reason: HOSPADM

## 2024-03-08 RX ADMIN — SODIUM CHLORIDE 1000 MG: 900 INJECTION INTRAVENOUS at 17:10

## 2024-03-08 RX ADMIN — SODIUM CHLORIDE: 9 INJECTION, SOLUTION INTRAVENOUS at 23:33

## 2024-03-08 RX ADMIN — ACETAMINOPHEN 650 MG: 325 TABLET ORAL at 17:10

## 2024-03-08 RX ADMIN — SODIUM CHLORIDE, PRESERVATIVE FREE 10 ML: 5 INJECTION INTRAVENOUS at 22:00

## 2024-03-08 RX ADMIN — SODIUM CHLORIDE 2040 ML: 9 INJECTION, SOLUTION INTRAVENOUS at 17:11

## 2024-03-08 ASSESSMENT — LIFESTYLE VARIABLES
HOW OFTEN DO YOU HAVE A DRINK CONTAINING ALCOHOL: NEVER
HOW OFTEN DO YOU HAVE A DRINK CONTAINING ALCOHOL: NEVER
HOW MANY STANDARD DRINKS CONTAINING ALCOHOL DO YOU HAVE ON A TYPICAL DAY: PATIENT DOES NOT DRINK
HOW MANY STANDARD DRINKS CONTAINING ALCOHOL DO YOU HAVE ON A TYPICAL DAY: PATIENT DOES NOT DRINK

## 2024-03-08 ASSESSMENT — PAIN SCALES - GENERAL: PAINLEVEL_OUTOF10: 0

## 2024-03-08 NOTE — ED NOTES
Assisted pt to bedside commode at this time. Pt required assistance to get to commode, was very unsteady on her feet. Assisted back to bed on monitor x3 call bell in reach with family at bedside.

## 2024-03-08 NOTE — ED PROVIDER NOTES
completed with voice recognition software. Quite often unanticipated grammatical, syntax, homophones, and other interpretive errors are inadvertently transcribed by the computer software. Please disregards these errors. Please excuse any errors that have escaped final proofreading.)      Mariposa Cabrera, TIARRA  03/09/24 1078

## 2024-03-09 ENCOUNTER — APPOINTMENT (OUTPATIENT)
Facility: HOSPITAL | Age: 84
DRG: 871 | End: 2024-03-09
Payer: MEDICARE

## 2024-03-09 LAB
ANION GAP SERPL CALC-SCNC: 8 MMOL/L (ref 5–15)
BASOPHILS # BLD: 0 K/UL (ref 0–0.1)
BASOPHILS NFR BLD: 0 % (ref 0–1)
BUN SERPL-MCNC: 11 MG/DL (ref 6–20)
BUN/CREAT SERPL: 12 (ref 12–20)
CALCIUM SERPL-MCNC: 7.9 MG/DL (ref 8.5–10.1)
CHLORIDE SERPL-SCNC: 107 MMOL/L (ref 97–108)
CO2 SERPL-SCNC: 21 MMOL/L (ref 21–32)
CREAT SERPL-MCNC: 0.89 MG/DL (ref 0.55–1.02)
DIFFERENTIAL METHOD BLD: ABNORMAL
EOSINOPHIL # BLD: 0.7 K/UL (ref 0–0.4)
EOSINOPHIL NFR BLD: 9 % (ref 0–7)
ERYTHROCYTE [DISTWIDTH] IN BLOOD BY AUTOMATED COUNT: 13.5 % (ref 11.5–14.5)
GLUCOSE SERPL-MCNC: 109 MG/DL (ref 65–100)
HCT VFR BLD AUTO: 38.6 % (ref 35–47)
HGB BLD-MCNC: 12.7 G/DL (ref 11.5–16)
IMM GRANULOCYTES # BLD AUTO: 0.1 K/UL (ref 0–0.04)
IMM GRANULOCYTES NFR BLD AUTO: 1 % (ref 0–0.5)
LYMPHOCYTES # BLD: 0.5 K/UL (ref 0.8–3.5)
LYMPHOCYTES NFR BLD: 7 % (ref 12–49)
MAGNESIUM SERPL-MCNC: 2 MG/DL (ref 1.6–2.4)
MCH RBC QN AUTO: 29.4 PG (ref 26–34)
MCHC RBC AUTO-ENTMCNC: 32.9 G/DL (ref 30–36.5)
MCV RBC AUTO: 89.4 FL (ref 80–99)
MONOCYTES # BLD: 0.7 K/UL (ref 0–1)
MONOCYTES NFR BLD: 10 % (ref 5–13)
NEUTS SEG # BLD: 5.1 K/UL (ref 1.8–8)
NEUTS SEG NFR BLD: 73 % (ref 32–75)
NRBC # BLD: 0 K/UL (ref 0–0.01)
NRBC BLD-RTO: 0 PER 100 WBC
PLATELET # BLD AUTO: 150 K/UL (ref 150–400)
PMV BLD AUTO: 9.8 FL (ref 8.9–12.9)
POTASSIUM SERPL-SCNC: 3.5 MMOL/L (ref 3.5–5.1)
PROCALCITONIN SERPL-MCNC: 0.2 NG/ML
RBC # BLD AUTO: 4.32 M/UL (ref 3.8–5.2)
SODIUM SERPL-SCNC: 136 MMOL/L (ref 136–145)
T4 FREE SERPL-MCNC: 1.3 NG/DL (ref 0.8–1.5)
TSH SERPL DL<=0.05 MIU/L-ACNC: 0.17 UIU/ML (ref 0.36–3.74)
VIT B12 SERPL-MCNC: 592 PG/ML (ref 193–986)
WBC # BLD AUTO: 7.1 K/UL (ref 3.6–11)

## 2024-03-09 PROCEDURE — 87040 BLOOD CULTURE FOR BACTERIA: CPT

## 2024-03-09 PROCEDURE — 2700000000 HC OXYGEN THERAPY PER DAY

## 2024-03-09 PROCEDURE — 85025 COMPLETE CBC W/AUTO DIFF WBC: CPT

## 2024-03-09 PROCEDURE — 6360000002 HC RX W HCPCS: Performed by: STUDENT IN AN ORGANIZED HEALTH CARE EDUCATION/TRAINING PROGRAM

## 2024-03-09 PROCEDURE — 80048 BASIC METABOLIC PNL TOTAL CA: CPT

## 2024-03-09 PROCEDURE — 97161 PT EVAL LOW COMPLEX 20 MIN: CPT

## 2024-03-09 PROCEDURE — 36415 COLL VENOUS BLD VENIPUNCTURE: CPT

## 2024-03-09 PROCEDURE — 6370000000 HC RX 637 (ALT 250 FOR IP): Performed by: STUDENT IN AN ORGANIZED HEALTH CARE EDUCATION/TRAINING PROGRAM

## 2024-03-09 PROCEDURE — 97530 THERAPEUTIC ACTIVITIES: CPT

## 2024-03-09 PROCEDURE — 83735 ASSAY OF MAGNESIUM: CPT

## 2024-03-09 PROCEDURE — 84145 PROCALCITONIN (PCT): CPT

## 2024-03-09 PROCEDURE — 6360000004 HC RX CONTRAST MEDICATION: Performed by: STUDENT IN AN ORGANIZED HEALTH CARE EDUCATION/TRAINING PROGRAM

## 2024-03-09 PROCEDURE — 97116 GAIT TRAINING THERAPY: CPT

## 2024-03-09 PROCEDURE — 2580000003 HC RX 258: Performed by: STUDENT IN AN ORGANIZED HEALTH CARE EDUCATION/TRAINING PROGRAM

## 2024-03-09 PROCEDURE — 97165 OT EVAL LOW COMPLEX 30 MIN: CPT | Performed by: OCCUPATIONAL THERAPIST

## 2024-03-09 PROCEDURE — 97535 SELF CARE MNGMENT TRAINING: CPT | Performed by: OCCUPATIONAL THERAPIST

## 2024-03-09 PROCEDURE — 2500000003 HC RX 250 WO HCPCS: Performed by: STUDENT IN AN ORGANIZED HEALTH CARE EDUCATION/TRAINING PROGRAM

## 2024-03-09 PROCEDURE — 70551 MRI BRAIN STEM W/O DYE: CPT

## 2024-03-09 PROCEDURE — 74177 CT ABD & PELVIS W/CONTRAST: CPT

## 2024-03-09 PROCEDURE — 1100000003 HC PRIVATE W/ TELEMETRY

## 2024-03-09 PROCEDURE — 71260 CT THORAX DX C+: CPT

## 2024-03-09 RX ORDER — METOPROLOL TARTRATE 1 MG/ML
5 INJECTION, SOLUTION INTRAVENOUS ONCE
Status: COMPLETED | OUTPATIENT
Start: 2024-03-09 | End: 2024-03-09

## 2024-03-09 RX ADMIN — SODIUM CHLORIDE: 9 INJECTION, SOLUTION INTRAVENOUS at 11:36

## 2024-03-09 RX ADMIN — METOPROLOL TARTRATE 5 MG: 1 INJECTION, SOLUTION INTRAVENOUS at 15:33

## 2024-03-09 RX ADMIN — SODIUM CHLORIDE 1000 MG: 900 INJECTION INTRAVENOUS at 21:19

## 2024-03-09 RX ADMIN — DILTIAZEM HYDROCHLORIDE 180 MG: 180 CAPSULE, COATED, EXTENDED RELEASE ORAL at 14:25

## 2024-03-09 RX ADMIN — ACETAMINOPHEN 650 MG: 325 TABLET ORAL at 14:27

## 2024-03-09 RX ADMIN — SODIUM CHLORIDE, PRESERVATIVE FREE 10 ML: 5 INJECTION INTRAVENOUS at 08:53

## 2024-03-09 RX ADMIN — IOPAMIDOL 100 ML: 755 INJECTION, SOLUTION INTRAVENOUS at 19:41

## 2024-03-09 RX ADMIN — AZITHROMYCIN MONOHYDRATE 500 MG: 500 INJECTION, POWDER, LYOPHILIZED, FOR SOLUTION INTRAVENOUS at 17:11

## 2024-03-09 RX ADMIN — ENOXAPARIN SODIUM 40 MG: 100 INJECTION SUBCUTANEOUS at 08:39

## 2024-03-09 RX ADMIN — SODIUM CHLORIDE, PRESERVATIVE FREE 10 ML: 5 INJECTION INTRAVENOUS at 21:20

## 2024-03-09 RX ADMIN — LEVOTHYROXINE SODIUM 88 MCG: 0.09 TABLET ORAL at 08:39

## 2024-03-09 RX ADMIN — ACETAMINOPHEN 650 MG: 325 TABLET ORAL at 04:08

## 2024-03-09 ASSESSMENT — PAIN SCALES - GENERAL
PAINLEVEL_OUTOF10: 5
PAINLEVEL_OUTOF10: 0

## 2024-03-09 ASSESSMENT — PAIN DESCRIPTION - DESCRIPTORS: DESCRIPTORS: ACHING

## 2024-03-09 ASSESSMENT — PAIN DESCRIPTION - LOCATION: LOCATION: HEAD

## 2024-03-09 ASSESSMENT — PAIN - FUNCTIONAL ASSESSMENT: PAIN_FUNCTIONAL_ASSESSMENT: PREVENTS OR INTERFERES SOME ACTIVE ACTIVITIES AND ADLS

## 2024-03-09 NOTE — ED NOTES
Bedside report given to MARIO Sexton at this time. Pt resting in bed eating snacks on monitor x3 with call bell in reach.

## 2024-03-09 NOTE — H&P
Esterase, Urine TRACE (A) NEG      WBC, UA 0-4 0 - 4 /hpf    RBC, UA 0-5 0 - 5 /hpf    Epithelial Cells UA MODERATE (A) FEW /lpf    BACTERIA, URINE Negative NEG /hpf    Urine Culture if Indicated CULTURE NOT INDICATED BY UA RESULT CNI     Rapid influenza A/B antigens    Collection Time: 03/08/24  6:35 PM    Specimen: Nasopharyngeal   Result Value Ref Range    Influenza A Ag Negative NEG      Influenza B Ag Negative NEG     COVID-19, Rapid    Collection Time: 03/08/24  6:35 PM    Specimen: Nasopharyngeal   Result Value Ref Range    Source Nasopharyngeal      SARS-CoV-2, Rapid Not detected NOTD           CT HEAD WO CONTRAST    Result Date: 3/8/2024  EXAM: CT HEAD WO CONTRAST INDICATION: change in ambulatory status COMPARISON: None. CONTRAST: None. TECHNIQUE: Unenhanced CT of the head was performed using 5 mm images. Brain and bone windows were generated. Coronal and sagittal reformats. CT dose reduction was achieved through use of a standardized protocol tailored for this examination and automatic exposure control for dose modulation.  FINDINGS: The ventricles and sulci are normal in size, shape and configuration. . Hypodensities in the external capsules bilaterally, patchy. There is no intracranial hemorrhage, extra-axial collection, or mass effect. The basilar cisterns are open. No CT evidence of acute infarct. The bone windows demonstrate no abnormalities. The visualized portions of the paranasal sinuses and mastoid air cells are clear.     No acute intracranial process seen     XR CHEST (2 VW)    Result Date: 3/8/2024  EXAM: XR CHEST (2 VW) INDICATION:  spo2 94%, febrile, meeting sepsis criteria COMPARISON: 12/23/2023 TECHNIQUE: PA and lateral chest views FINDINGS: The cardiac size is stable. The chest is hyperinflated. There are a few linear bands at the right lung base. Patient is rotated to the right.     Right basilar subsegmental atelectasis. Lung hyperinflation.

## 2024-03-10 ENCOUNTER — APPOINTMENT (OUTPATIENT)
Facility: HOSPITAL | Age: 84
DRG: 871 | End: 2024-03-10
Payer: MEDICARE

## 2024-03-10 LAB
ANION GAP SERPL CALC-SCNC: 8 MMOL/L (ref 5–15)
BUN SERPL-MCNC: 9 MG/DL (ref 6–20)
BUN/CREAT SERPL: 18 (ref 12–20)
CALCIUM SERPL-MCNC: 7.5 MG/DL (ref 8.5–10.1)
CHLORIDE SERPL-SCNC: 108 MMOL/L (ref 97–108)
CO2 SERPL-SCNC: 21 MMOL/L (ref 21–32)
CREAT SERPL-MCNC: 0.51 MG/DL (ref 0.55–1.02)
ERYTHROCYTE [DISTWIDTH] IN BLOOD BY AUTOMATED COUNT: 13.6 % (ref 11.5–14.5)
GLUCOSE SERPL-MCNC: 89 MG/DL (ref 65–100)
HCT VFR BLD AUTO: 38.2 % (ref 35–47)
HGB BLD-MCNC: 12.2 G/DL (ref 11.5–16)
MCH RBC QN AUTO: 28.6 PG (ref 26–34)
MCHC RBC AUTO-ENTMCNC: 31.9 G/DL (ref 30–36.5)
MCV RBC AUTO: 89.7 FL (ref 80–99)
NRBC # BLD: 0 K/UL (ref 0–0.01)
NRBC BLD-RTO: 0 PER 100 WBC
PLATELET # BLD AUTO: 144 K/UL (ref 150–400)
PMV BLD AUTO: 10 FL (ref 8.9–12.9)
POTASSIUM SERPL-SCNC: 3.4 MMOL/L (ref 3.5–5.1)
RBC # BLD AUTO: 4.26 M/UL (ref 3.8–5.2)
SODIUM SERPL-SCNC: 137 MMOL/L (ref 136–145)
WBC # BLD AUTO: 9 K/UL (ref 3.6–11)

## 2024-03-10 PROCEDURE — 85027 COMPLETE CBC AUTOMATED: CPT

## 2024-03-10 PROCEDURE — 2700000000 HC OXYGEN THERAPY PER DAY

## 2024-03-10 PROCEDURE — 93005 ELECTROCARDIOGRAM TRACING: CPT | Performed by: INTERNAL MEDICINE

## 2024-03-10 PROCEDURE — 2580000003 HC RX 258: Performed by: STUDENT IN AN ORGANIZED HEALTH CARE EDUCATION/TRAINING PROGRAM

## 2024-03-10 PROCEDURE — 94760 N-INVAS EAR/PLS OXIMETRY 1: CPT

## 2024-03-10 PROCEDURE — 6360000002 HC RX W HCPCS: Performed by: STUDENT IN AN ORGANIZED HEALTH CARE EDUCATION/TRAINING PROGRAM

## 2024-03-10 PROCEDURE — 36415 COLL VENOUS BLD VENIPUNCTURE: CPT

## 2024-03-10 PROCEDURE — 2500000003 HC RX 250 WO HCPCS: Performed by: INTERNAL MEDICINE

## 2024-03-10 PROCEDURE — 6370000000 HC RX 637 (ALT 250 FOR IP): Performed by: STUDENT IN AN ORGANIZED HEALTH CARE EDUCATION/TRAINING PROGRAM

## 2024-03-10 PROCEDURE — 2500000003 HC RX 250 WO HCPCS: Performed by: STUDENT IN AN ORGANIZED HEALTH CARE EDUCATION/TRAINING PROGRAM

## 2024-03-10 PROCEDURE — 2060000000 HC ICU INTERMEDIATE R&B

## 2024-03-10 PROCEDURE — 80048 BASIC METABOLIC PNL TOTAL CA: CPT

## 2024-03-10 PROCEDURE — 71045 X-RAY EXAM CHEST 1 VIEW: CPT

## 2024-03-10 RX ORDER — 0.9 % SODIUM CHLORIDE 0.9 %
500 INTRAVENOUS SOLUTION INTRAVENOUS ONCE
Status: COMPLETED | OUTPATIENT
Start: 2024-03-10 | End: 2024-03-10

## 2024-03-10 RX ORDER — DILTIAZEM HYDROCHLORIDE 5 MG/ML
10 INJECTION INTRAVENOUS ONCE
Status: COMPLETED | OUTPATIENT
Start: 2024-03-10 | End: 2024-03-10

## 2024-03-10 RX ORDER — METOPROLOL TARTRATE 1 MG/ML
5 INJECTION, SOLUTION INTRAVENOUS EVERY 6 HOURS
Status: DISCONTINUED | OUTPATIENT
Start: 2024-03-10 | End: 2024-03-10 | Stop reason: DRUGHIGH

## 2024-03-10 RX ORDER — POTASSIUM CHLORIDE 20 MEQ/1
20 TABLET, EXTENDED RELEASE ORAL ONCE
Status: COMPLETED | OUTPATIENT
Start: 2024-03-10 | End: 2024-03-10

## 2024-03-10 RX ORDER — CASTOR OIL AND BALSAM, PERU 788; 87 MG/G; MG/G
OINTMENT TOPICAL 2 TIMES DAILY
Status: DISCONTINUED | OUTPATIENT
Start: 2024-03-10 | End: 2024-03-11 | Stop reason: HOSPADM

## 2024-03-10 RX ADMIN — DILTIAZEM HYDROCHLORIDE 180 MG: 180 CAPSULE, COATED, EXTENDED RELEASE ORAL at 08:45

## 2024-03-10 RX ADMIN — LEVOTHYROXINE SODIUM 88 MCG: 0.09 TABLET ORAL at 08:45

## 2024-03-10 RX ADMIN — DILTIAZEM HYDROCHLORIDE 10 MG: 5 INJECTION, SOLUTION INTRAVENOUS at 03:29

## 2024-03-10 RX ADMIN — ENOXAPARIN SODIUM 40 MG: 100 INJECTION SUBCUTANEOUS at 08:45

## 2024-03-10 RX ADMIN — AZITHROMYCIN MONOHYDRATE 500 MG: 500 INJECTION, POWDER, LYOPHILIZED, FOR SOLUTION INTRAVENOUS at 17:07

## 2024-03-10 RX ADMIN — SODIUM CHLORIDE 500 ML: 9 INJECTION, SOLUTION INTRAVENOUS at 00:34

## 2024-03-10 RX ADMIN — SODIUM CHLORIDE: 9 INJECTION, SOLUTION INTRAVENOUS at 17:07

## 2024-03-10 RX ADMIN — SODIUM CHLORIDE, PRESERVATIVE FREE 10 ML: 5 INJECTION INTRAVENOUS at 08:46

## 2024-03-10 RX ADMIN — Medication: at 08:45

## 2024-03-10 RX ADMIN — Medication: at 22:04

## 2024-03-10 RX ADMIN — ACETAMINOPHEN 650 MG: 325 TABLET ORAL at 22:02

## 2024-03-10 RX ADMIN — SODIUM CHLORIDE, PRESERVATIVE FREE 10 ML: 5 INJECTION INTRAVENOUS at 22:04

## 2024-03-10 RX ADMIN — POTASSIUM CHLORIDE 20 MEQ: 1500 TABLET, EXTENDED RELEASE ORAL at 15:50

## 2024-03-10 RX ADMIN — METOROPROLOL TARTRATE 5 MG: 5 INJECTION, SOLUTION INTRAVENOUS at 00:32

## 2024-03-10 ASSESSMENT — PAIN DESCRIPTION - ORIENTATION: ORIENTATION: MID

## 2024-03-10 ASSESSMENT — PAIN SCALES - GENERAL
PAINLEVEL_OUTOF10: 3
PAINLEVEL_OUTOF10: 0

## 2024-03-10 ASSESSMENT — PAIN DESCRIPTION - DESCRIPTORS: DESCRIPTORS: ACHING

## 2024-03-10 ASSESSMENT — PAIN SCALES - WONG BAKER
WONGBAKER_NUMERICALRESPONSE: 0
WONGBAKER_NUMERICALRESPONSE: 0

## 2024-03-10 ASSESSMENT — PAIN DESCRIPTION - LOCATION: LOCATION: BACK

## 2024-03-10 ASSESSMENT — PAIN - FUNCTIONAL ASSESSMENT: PAIN_FUNCTIONAL_ASSESSMENT: ACTIVITIES ARE NOT PREVENTED

## 2024-03-11 VITALS
TEMPERATURE: 98 F | RESPIRATION RATE: 18 BRPM | WEIGHT: 150 LBS | OXYGEN SATURATION: 95 % | DIASTOLIC BLOOD PRESSURE: 80 MMHG | SYSTOLIC BLOOD PRESSURE: 152 MMHG | HEART RATE: 72 BPM | HEIGHT: 63 IN | BODY MASS INDEX: 26.58 KG/M2

## 2024-03-11 LAB
ANION GAP SERPL CALC-SCNC: 5 MMOL/L (ref 5–15)
BUN SERPL-MCNC: 7 MG/DL (ref 6–20)
BUN/CREAT SERPL: 16 (ref 12–20)
CALCIUM SERPL-MCNC: 8.1 MG/DL (ref 8.5–10.1)
CHLORIDE SERPL-SCNC: 108 MMOL/L (ref 97–108)
CO2 SERPL-SCNC: 25 MMOL/L (ref 21–32)
CREAT SERPL-MCNC: 0.44 MG/DL (ref 0.55–1.02)
ERYTHROCYTE [DISTWIDTH] IN BLOOD BY AUTOMATED COUNT: 13.3 % (ref 11.5–14.5)
GLUCOSE SERPL-MCNC: 88 MG/DL (ref 65–100)
HCT VFR BLD AUTO: 37.3 % (ref 35–47)
HGB BLD-MCNC: 12.3 G/DL (ref 11.5–16)
MCH RBC QN AUTO: 29.6 PG (ref 26–34)
MCHC RBC AUTO-ENTMCNC: 33 G/DL (ref 30–36.5)
MCV RBC AUTO: 89.9 FL (ref 80–99)
NRBC # BLD: 0 K/UL (ref 0–0.01)
NRBC BLD-RTO: 0 PER 100 WBC
PLATELET # BLD AUTO: 74 K/UL (ref 150–400)
PMV BLD AUTO: 10.1 FL (ref 8.9–12.9)
POTASSIUM SERPL-SCNC: 3.5 MMOL/L (ref 3.5–5.1)
RBC # BLD AUTO: 4.15 M/UL (ref 3.8–5.2)
SODIUM SERPL-SCNC: 138 MMOL/L (ref 136–145)
WBC # BLD AUTO: 6.5 K/UL (ref 3.6–11)

## 2024-03-11 PROCEDURE — 2580000003 HC RX 258: Performed by: STUDENT IN AN ORGANIZED HEALTH CARE EDUCATION/TRAINING PROGRAM

## 2024-03-11 PROCEDURE — 6370000000 HC RX 637 (ALT 250 FOR IP): Performed by: STUDENT IN AN ORGANIZED HEALTH CARE EDUCATION/TRAINING PROGRAM

## 2024-03-11 PROCEDURE — 85027 COMPLETE CBC AUTOMATED: CPT

## 2024-03-11 PROCEDURE — 36415 COLL VENOUS BLD VENIPUNCTURE: CPT

## 2024-03-11 PROCEDURE — 80048 BASIC METABOLIC PNL TOTAL CA: CPT

## 2024-03-11 PROCEDURE — 6360000002 HC RX W HCPCS: Performed by: STUDENT IN AN ORGANIZED HEALTH CARE EDUCATION/TRAINING PROGRAM

## 2024-03-11 RX ORDER — LEVOFLOXACIN 750 MG/1
750 TABLET, FILM COATED ORAL DAILY
Qty: 3 TABLET | Refills: 0 | Status: SHIPPED | OUTPATIENT
Start: 2024-03-11 | End: 2024-03-14

## 2024-03-11 RX ADMIN — DILTIAZEM HYDROCHLORIDE 180 MG: 180 CAPSULE, COATED, EXTENDED RELEASE ORAL at 08:45

## 2024-03-11 RX ADMIN — Medication: at 08:45

## 2024-03-11 RX ADMIN — SODIUM CHLORIDE, PRESERVATIVE FREE 5 ML: 5 INJECTION INTRAVENOUS at 08:46

## 2024-03-11 RX ADMIN — ENOXAPARIN SODIUM 40 MG: 100 INJECTION SUBCUTANEOUS at 08:45

## 2024-03-11 RX ADMIN — LEVOTHYROXINE SODIUM 88 MCG: 0.09 TABLET ORAL at 08:45

## 2024-03-11 ASSESSMENT — PAIN SCALES - GENERAL
PAINLEVEL_OUTOF10: 0

## 2024-03-11 ASSESSMENT — PAIN SCALES - WONG BAKER: WONGBAKER_NUMERICALRESPONSE: 0

## 2024-03-11 NOTE — PLAN OF CARE
Problem: Discharge Planning  Goal: Discharge to home or other facility with appropriate resources  Outcome: Adequate for Discharge     Problem: Safety - Adult  Goal: Free from fall injury  Outcome: Completed     Problem: ABCDS Injury Assessment  Goal: Absence of physical injury  Outcome: Completed     Problem: Skin/Tissue Integrity  Goal: Absence of new skin breakdown  Description: 1.  Monitor for areas of redness and/or skin breakdown  2.  Assess vascular access sites hourly  3.  Every 4-6 hours minimum:  Change oxygen saturation probe site  4.  Every 4-6 hours:  If on nasal continuous positive airway pressure, respiratory therapy assess nares and determine need for appliance change or resting period.  Outcome: Completed     
  Problem: Safety - Adult  Goal: Free from fall injury  Outcome: Progressing     Problem: ABCDS Injury Assessment  Goal: Absence of physical injury  Outcome: Progressing     
  Problem: Skin/Tissue Integrity  Goal: Absence of new skin breakdown  Description: 1.  Monitor for areas of redness and/or skin breakdown  2.  Assess vascular access sites hourly  3.  Every 4-6 hours minimum:  Change oxygen saturation probe site  4.  Every 4-6 hours:  If on nasal continuous positive airway pressure, respiratory therapy assess nares and determine need for appliance change or resting period.  Outcome: Progressing     Problem: Discharge Planning  Goal: Discharge to home or other facility with appropriate resources  Recent Flowsheet Documentation  Taken 3/8/2024 2324 by Risa Corey RN  Discharge to home or other facility with appropriate resources:   Identify discharge learning needs (meds, wound care, etc)   Identify barriers to discharge with patient and caregiver     
(6-Clicks) Version 2  How much HELP from another person do you currently need... (If the patient hasn't done an activity recently, how much help from another person do you think they would need if they tried?) Total A Lot A Little None   1.  Turning from your back to your side while in a flat bed without using bedrails? []  1 []  2 []  3  [x]  4   2.  Moving from lying on your back to sitting on the side of a flat bed without using bedrails? []  1 []  2 [x]  3  []  4   3.  Moving to and from a bed to a chair (including a wheelchair)? []  1 []  2 [x]  3  []  4   4. Standing up from a chair using your arms (e.g. wheelchair or bedside chair)? []  1 []  2 [x]  3  []  4   5.  Walking in hospital room? []  1 []  2 [x]  3  []  4   6.  Climbing 3-5 steps with a railing? []  1 [x]  2 []  3  []  4     Raw Score: 18/24                            Cutoff score ?171,2,3 had higher odds of discharging home with home health or need of SNF/IPR.    1. Ally Trevino, Chelsy Coombs, Pete Naidu, Holli Dawn, Renzo Porras, Luis Eduardo Trevino.  Validity of the AM-PAC “6-Clicks” Inpatient Daily Activity and Basic Mobility Short Forms. Physical Therapy Mar 2014, [phone]; DOI: 10.2522/ptj.83290163  2. Tarun CARREON, Allison ANDREW, Jorge ANDREW, Danny ANDREW. Association of AM-PAC \"6-Clicks\" Basic Mobility and Daily Activity Scores With Discharge Destination. Phys Ther. 2021 Apr 4;101(4):jypy004. doi: 10.1093/ptj/mwsx402. PMID: 53047221.  3. Ryland ANDREW, Kevin RODRIGUEZ, Lucero S, Lynda K, Nidhi S. Activity Measure for Post-Acute Care \"6-Clicks\" Basic Mobility Scores Predict Discharge Destination After Acute Care Hospitalization in Select Patient Groups: A Retrospective, Observational Study. Arch Rehabil Res Clin Transl. 2022 Jul 16;4(3):379353. doi: 10.1016/j.arrct.2022.907670. PMID: 39842427; PMCID: [id number].  4. Belinda RIOS, Heather S, Nitza W, Camille P. AM-PAC Short Forms Manual 4.0. Revised 2/2020.                                        
to enable pt to complete eval   Based on the above components, the patient evaluation is determined to be of the following complexity level: Medium

## 2024-03-11 NOTE — CARE COORDINATION
10:44 A mass referral was sent to secure a home health agency. [facility] (formerly [facility]) Phone: [phone]  SOC within 48 hours. Is the only accepting agency. The patient declined SNF    Phoenix Sullivan RN  Case Management  [phone]

## 2024-03-11 NOTE — DISCHARGE SUMMARY
Discharge Summary    Name: Lynette Hubbard  [id number]  YOB: 1940 (Age: 83 y.o.)   Date of Admission: 3/8/2024  Date of Discharge: 3/11/2024  Attending Physician: Milvia Tyler MD    Discharge Diagnosis:  Difficulty ambulation  Generalized weakness likely due to  Severe sepsis  Pneumonia  Hyponatremia  Hypothyroidism  Hypertension  PSVT      Consultations:  IP CONSULT TO HOSPITALIST  IP WOUND CARE NURSE CONSULT TO EVAL  IP CONSULT TO CASE MANAGEMENT      Brief Admission History/Reason for Admission Per Juliana Payton MD:   Lynette Hubbard is a 83 y.o.  female with PMHx significant as below, wake up this morning close difficulty walking and was unable to go to the bathroom she feels her both legs were very heavy, could not to stand up, patient also complained of headache which is started the night before, patient stated she completed just full course of Bactrim and Macrobid for UTI, patient denied any shortness of breath cough or fever, no chills.  In the ED here patient she stated she walked to the commode at the bedside  We were asked to admit for work up and evaluation of the above problems.     Brief Hospital Course by Main Problems:   Difficulty ambulation  Generalized weakness     Her generalized weakness was likely due to sepsis, had fever around 101 since admission for 2 days.  TSH 0.17, T4 is normal  Vitamin B12 is normal  CT of the head unremarkable  PT OT: Recommend home health versus SNF  MRI showed no acute pathology     Severe sepsis  Suspected UTI  Received 30 cc/kg per sepsis protocol  She been on antibiotic 2 weeks   Continue ceftriaxone, azithromycin  Blood culture was negative, urine culture was never sent  Fever around 101 for 2 days after admission, last fever was on 3/9  CT chest shows bibasilar homogeneous consolidation, likely atelectasis  CT abdomen shows colonic diverticulosis without diverticulitis  Sepsis likely due to UTI

## 2024-03-11 NOTE — PROGRESS NOTES
Hospitalist Progress Note    NAME:   Lynette Hubbard   : 1940   MRN: [id number]     Date/Time: 3/9/2024 1:59 PM  Patient PCP: Sy Reynoso MD    Estimated discharge date: 3/11  Barriers: Clinical improvement      Assessment / Plan:  Difficulty ambulation  Generalized weakness    Check TSH, B12 level  CT of the head unremarkable  PT OT evaluating today  MRI brain pending     Severe sepsis  Suspected UTI  Received 30 cc/kg per sepsis protocol  She been on antibiotic 2 weeks   Continue ceftriaxone  Follow-up cultures  Monitor fever spikes    Addendum: continues to spike fever, blood culture, CT chest/abdomen ordered     Hyponatremia  Likely hypohypovolemic hyponatremia  Continue IV fluids  Sodium improving     Hypothyroidism  Continue levothyroxine  Check TSH     HTN  Blood pressure at the lower side  Will hold home medication     PSVT  Continue with Cardizem     Medical Decision Making:   I personally reviewed labs: yes  I personally reviewed imaging:yes  I personally reviewed EKG:  Toxic drug monitoring: yes  Discussed case with: RN,   Continue with Cardizem     Code Status: Full Code  DVT Prophylaxis: Lovenox  Baseline: Independent of ADL        Subjective:     Chief Complaint / Reason for Physician Visit  See and evaluated at the bedside for altered mental status  She is feeling better today  Worked with physical therapy earlier      Objective:     VITALS:   Last 24hrs VS reviewed since prior progress note. Most recent are:  Patient Vitals for the past 24 hrs:   BP Temp Temp src Pulse Resp SpO2 Height Weight   24 1050 (!) 150/74 -- -- 89 -- 94 % -- --   24 0736 105/65 98.6 °F (37 °C) Oral 77 18 94 % -- --   24 0338 (!) 158/75 (!) 102 °F (38.9 °C) Oral 97 18 93 % -- --   24 0030 -- -- -- -- 20 94 % -- --   24 2325 139/81 99.7 °F (37.6 °C) Axillary 98 (!) 40 91 % -- --   24 2215 -- 99.8 °F (37.7 °C) Oral -- -- -- -- --   24 2115 (!) 110/58 100 °F 
-Please complete MRI History and Safety Screening Form.    - Patient cannot be scanned until this form is completed and reviewed in MRI to ensure patient is SAFE and eligible for MRI.  - CALL MRI when this has been successfully completed at [phone].  
0710- Bedside and Verbal shift change report given to MARIO Izaguirre (oncoming nurse) by MARIO Koroma (offgoing nurse). Report included the following information Nurse Handoff Report, MAR, and Cardiac Rhythm NSR .       End of Shift Note    Bedside shift change report given to MARIO Roberts (oncoming nurse) by Zina Lindsay RN (offgoing nurse).  Report included the following information SBAR, MAR, Recent Results, and Cardiac Rhythm NSR     Shift worked:  9452-3157     Shift summary and any significant changes:     Patient 2 L N/C , patient had 2 Bms during shift , no complaints of pain during shift      Concerns for physician to address:  PT/OT consult?      Zone phone for oncoming shift:   [phone]       Activity:     Number times ambulated in hallways past shift: 0  Number of times OOB to chair past shift: 0    Cardiac:   Cardiac Monitoring: Yes           Access:  Current line(s): PICC     Genitourinary:   Urinary status: voiding and external catheter    Respiratory:      Chronic home O2 use?: NO  Incentive spirometer at bedside: NO       GI:     Current diet:  ADULT DIET; Regular; 3 carb choices (45 gm/meal)  Passing flatus: YES  Tolerating current diet: YES       Pain Management:   Patient states pain is manageable on current regimen: YES    Skin:     Interventions: float heels, increase time out of bed, internal/external urinary devices, and nutritional support    Patient Safety:  Fall Score:    Interventions: bed/chair alarm, gripper socks, and pt to call before getting OOB       Length of Stay:  Expected LOS: 3  Actual LOS: 2      Zina Lindsay RN                            
0725- Bedside and Verbal shift change report given to MARIO Izaguirre (oncoming nurse) by MARIO Roberts (offgoing nurse). Report included the following information Nurse Handoff Report, MAR, Recent Results, and Cardiac Rhythm NSR .     1516- Went over discharge paperwork with patient, patient had no questions at this time, IV removed, patient discharged.   
1440: Patient sustaining HR in the 160's and temperature at 101.2 with report of mild headache, tylenol administered.    1447: Provider notified of patient current status. One-time dose of 5mg IV metoprolol ordered.    1545: 5mg IV Metoprolol administered, HR decreased to the 130's; patient asymptomatic.    1650: additional orders received.    1702: temperature currently 98.1    1800: blood cultures completed, antibiotics administered.    1917: HR currently NSR.    Bedside shift change report given to MARIO Chavez (oncoming nurse) by MARIO Nunez (offgoing nurse). Report included the following information Nurse Handoff Report, Index, ED Encounter Summary, ED SBAR, Adult Overview, Intake/Output, MAR, Recent Results, Med Rec Status, Cardiac Rhythm NSR, Quality Measures, and Neuro Assessment.    Patient was awake, alert and  oriented at shift change report.      
4 Eyes Skin Assessment     NAME:  Lynette Hubbard  YOB: 1940  MEDICAL RECORD NUMBER:  [id number]    The patient is being assessed for  Transfer to New Unit    I agree that at least one RN has performed a thorough Head to Toe Skin Assessment on the patient. ALL assessment sites listed below have been assessed.      Areas assessed by both nurses:    Head, Face, Ears, Shoulders, Back, Chest, Arms, Elbows, Hands, Sacrum. Buttock, Coccyx, Ischium, and Legs. Feet and Heels        Does the Patient have a Wound? Yes wound(s) were present on assessment. LDA wound assessment was Initiated and completed by RN       Joshua Prevention initiated by RN: Yes  Wound Care Orders initiated by RN: Yes    Pressure Injury (Stage 3,4, Unstageable, DTI, NWPT, and Complex wounds) if present, place Wound referral order by RN under : No    New Ostomies, if present place, Ostomy referral order under : No     Nurse 1 eSignature: Electronically signed by Ga Subramanian RN on 3/10/24 at 6:19 AM EDT    **SHARE this note so that the co-signing nurse can place an eSignature**    Nurse 2 eSignature: Electronically signed by Surekha Beckham RN on 3/10/24 at 6:36 AM EDT    
End of Shift Note    Bedside shift change report given to MARIO Flowers (oncoming nurse) by Brenda Connolly RN (offgoing nurse).  Report included the following information SBAR, Kardex, ED Summary, Intake/Output, MAR, Recent Results, Med Rec Status, and Cardiac Rhythm NSR    Shift worked:  7p-7a     Shift summary and any significant changes:     Patient continue with schedule antibiotics for UTI.      Concerns for physician to address:  None     Zone phone for oncoming shift:   [phone]       Activity:     Number times ambulated in hallways past shift: 0  Number of times OOB to chair past shift: 0    Cardiac:   Cardiac Monitoring: Yes           Access:  Current line(s): PIV     Genitourinary:   Urinary status: voiding and external catheter    Respiratory:      Chronic home O2 use?: NO  Incentive spirometer at bedside: NO       GI:     Current diet:  ADULT DIET; Regular; 3 carb choices (45 gm/meal)  Passing flatus: YES  Tolerating current diet: YES       Pain Management:   Patient states pain is manageable on current regimen: YES    Skin:     Interventions: float heels, increase time out of bed, PT/OT consult, internal/external urinary devices, and nutritional support    Patient Safety:  Fall Score:    Interventions: bed/chair alarm, assistive device (walker, cane. etc), gripper socks, and stay with me (per policy)       Length of Stay:  Expected LOS: 3  Actual LOS: 3      Brenda Connolly RN                            
Patient admitted for severe sepsis secondary to UTI, rapid response called for heart rate in the 140s.  Patient is asymptomatic.  Patient seen and evaluated  Vital signs showed temperature of 99.5, EKG showed SVT  Systolic blood pressure in the 130s  Will order IV fluid bolus 500 cc  IV metoprolol 5mg once  Transferred to stepdown unit  If recurrent tachycardia, will order IV Dilt push    Dg : SVT     I have spent critical care time involved in lab review, consultations with specialist, family decision-making, and documentation. During this entire length of time I was immediately available to the patient. The reason for providing this level of medical care for this critically ill patient was due to a critical illness that impaired one or more vital organ systems such that there was a high probability of imminent or life threatening deterioration in the patients condition. This care involved high complexity decision making to assess, manipulate, and support vital system functions, to treat this degreee vital organ system failure and to prevent further life threatening deterioration of the patient’s condition.      Time 30mn    
Pt admitted oriented X4 speech clear visibly and verbally anxious  Pt was unable to stand independently had to be pulled over to bed  Pt provided water  Skin assessment completed with Heather RN  CHG completed on arrival  Call bell in reach educated on use and pt return demonstrated use  Non skid socks applied  Pt oriented to stay with me policy   Bed alarm on and in place  
Pt heart rate in the 140's and asymptomatic. EKG was done which showed SVT. /78 , Temp 99.7, SPO2 in the 90's. Oxygen moved up from 2l/min to 4l/min. Rapid response called and she was evaluated.    IV Metoprolol 5mg stat was given and IV bolus 500 and she was transferred to PCU.   
RAPID RESPONSE TEAM    Overhead rapid response paged to room # 3242  at 0018    Reason for rapid response: Tachycardia     Initial assessment:   Upon arrival patient is alert and oriented with HR in the 140s. Patient is asymptomatic at this time. Patient temperature is 99.7 and BP is 130/84.      Dr. Singh at bedside, orders received for the following Interventions:   -12 lead EKG  -500cc NS bolus  -5mg metoprolol IV push once  -Transfer to stepdown    Outcome:   pt transferred to room # 2315/01     0329: Patient HR still in the 130s. Received orders from Dr. Singh for 10mg dilt IV push once and another 12 lead EKG.  Patient is now on 5L nasal cannula so STAT chest xray ordered.  After dilt push patient HR improved to the 70s.        Please call with any questions or concerns    Erin Morfin RN  Rapid Response Team  Ext [phone]     Recent Results (from the past 8 hour(s))   CBC    Collection Time: 03/10/24  3:04 AM   Result Value Ref Range    WBC 9.0 3.6 - 11.0 K/uL    RBC 4.26 3.80 - 5.20 M/uL    Hemoglobin 12.2 11.5 - 16.0 g/dL    Hematocrit 38.2 35.0 - 47.0 %    MCV 89.7 80.0 - 99.0 FL    MCH 28.6 26.0 - 34.0 PG    MCHC 31.9 30.0 - 36.5 g/dL    RDW 13.6 11.5 - 14.5 %    Platelets 144 (L) 150 - 400 K/uL    MPV 10.0 8.9 - 12.9 FL    Nucleated RBCs 0.0 0  WBC    nRBC 0.00 0.00 - 0.01 K/uL   EKG 12 Lead    Collection Time: 03/10/24  3:26 AM   Result Value Ref Range    Ventricular Rate 137 BPM    QRS Duration 88 ms    Q-T Interval 292 ms    QTc Calculation (Bazett) 440 ms    R Axis 43 degrees    T Axis -31 degrees    Diagnosis       Supraventricular tachycardia  Nonspecific ST and T wave abnormality  Abnormal ECG  When compared with ECG of 23-DEC-2023 12:10,  Sinus rhythm has replaced Junctional rhythm  Vent. rate has increased BY  80 BPM  Criteria for Inferior infarct are no longer present  ST now depressed in Anterior leads          
    Activity:     Number times ambulated in hallways past shift: 0  Number of times OOB to chair past shift: 0    Cardiac:   Cardiac Monitoring: Yes           Access:  Current line(s): PIV     Genitourinary:   Urinary status: voiding and external catheter    Respiratory:      Chronic home O2 use?: NO  Incentive spirometer at bedside: NO       GI:     Current diet:  ADULT DIET; Regular; 3 carb choices (45 gm/meal)  Passing flatus: YES  Tolerating current diet: YES       Pain Management:   Patient states pain is manageable on current regimen: YES    Skin:     Interventions: specialty bed, float heels, increase time out of bed, PT/OT consult, and internal/external urinary devices    Patient Safety:  Fall Score:    Interventions: bed/chair alarm, assistive device (walker, cane. etc), gripper socks, pt to call before getting OOB, and stay with me (per policy)       Length of Stay:  Expected LOS: 3  Actual LOS: 2      Ga Subramanian RN                            
03/08/24  1559 03/09/24  0406 03/10/24  0304   * 136 137   K 4.9 3.5 3.4*    107 108   CO2 19* 21 21   GLUCOSE 134* 109* 89   BUN 15 11 9   CREATININE 1.01 0.89 0.51*   CALCIUM 9.5 7.9* 7.5*   MG  --  2.0  --    LABALBU 3.3*  --   --    BILITOT 0.8  --   --    AST 52*  --   --    ALT 33  --   --        Signed: Milvia Tyler MD

## 2024-03-11 NOTE — WOUND CARE
Wound care nurse consult for a possible DTI to sacrum.    82 y/o female admitted for severe sepsis.   Past Medical History:   Diagnosis Date    Hypertension     PSVT (paroxysmal supraventricular tachycardia)     Thyroid disease      Past Surgical History:   Procedure Laterality Date    ORTHOPEDIC SURGERY      R knee     Patient has a light maroon discoloration to sacrum that is NOT a DTI.      Patient has vitiligo     Patient incontinent of urine and using Purewick ECD and an adult brief. Patient needs incontinence protective cream such as zinc cream.   HAYLEY MESA RN, CWON

## 2024-03-11 NOTE — CARE COORDINATION
Care Management Initial Assessment       RUR: 13% (Low RUR)  Readmission? No  1st IM letter given? Yes - 03/09/24  1st  letter given: No     03/11/24 0831   Service Assessment   Patient Orientation Alert and Oriented   Cognition Alert   History Provided By Patient   Primary Caregiver Self   Accompanied By/Relationship no one   Support Systems Family Members  (Payal (granddaughter)  [phone])   Patient's Healthcare Decision Maker is: Legal Next of Kin   Prior Functional Level Independent in ADLs/IADLs   Current Functional Level Independent in ADLs/IADLs   Can patient return to prior living arrangement Yes   Ability to make needs known: Good   Family able to assist with home care needs: Yes   Would you like for me to discuss the discharge plan with any other family members/significant others, and if so, who? Yes  (Payal (Other)  [phone])   Financial Resources Medicare   Community Resources None   Social/Functional History   Lives With Alone   Type of Home Apartment   Home Layout One level   Home Access Elevator   Home Equipment None   Active  Yes   Discharge Planning   Type of Residence Apartment   Living Arrangements Alone   Current Services Prior To Admission None   Potential Assistance Needed N/A   Patient expects to be discharged to: Apartment   Condition of Participation: Discharge Planning   The Plan for Transition of Care is related to the following treatment goals: Home health/IPR/SNF  Goal: The patient/caregiver will verbalizes/displays a method to prevent falls    The patient/caregiver will verbalizes/displays a method to prevent infection and promote safety   The Patient and/or Patient Representative was provided with a Choice of Provider? Patient   The Patient and/Or Patient Representative agree with the Discharge Plan? Yes   Freedom of Choice list was provided with basic dialogue that supports the patient's individualized plan of care/goals, treatment preferences, and shares the

## 2024-03-12 LAB
EKG DIAGNOSIS: NORMAL
EKG Q-T INTERVAL: 292 MS
EKG QRS DURATION: 88 MS
EKG QTC CALCULATION (BAZETT): 440 MS
EKG R AXIS: 43 DEGREES
EKG T AXIS: -31 DEGREES
EKG VENTRICULAR RATE: 137 BPM

## 2024-09-13 ENCOUNTER — OFFICE VISIT (OUTPATIENT)
Age: 84
End: 2024-09-13

## 2024-09-13 VITALS
HEART RATE: 68 BPM | WEIGHT: 144.8 LBS | DIASTOLIC BLOOD PRESSURE: 96 MMHG | SYSTOLIC BLOOD PRESSURE: 144 MMHG | OXYGEN SATURATION: 97 % | BODY MASS INDEX: 25.66 KG/M2 | HEIGHT: 63 IN

## 2024-09-13 DIAGNOSIS — I47.10 PSVT (PAROXYSMAL SUPRAVENTRICULAR TACHYCARDIA) (HCC): Primary | ICD-10-CM

## 2024-09-13 RX ORDER — DILTIAZEM HYDROCHLORIDE 240 MG/1
240 CAPSULE, COATED, EXTENDED RELEASE ORAL DAILY
Qty: 90 CAPSULE | Refills: 1 | Status: SHIPPED | OUTPATIENT
Start: 2024-09-13

## 2024-09-13 RX ORDER — ATORVASTATIN CALCIUM 20 MG/1
20 TABLET, FILM COATED ORAL DAILY
COMMUNITY

## 2024-09-13 ASSESSMENT — PATIENT HEALTH QUESTIONNAIRE - PHQ9
SUM OF ALL RESPONSES TO PHQ QUESTIONS 1-9: 0
1. LITTLE INTEREST OR PLEASURE IN DOING THINGS: NOT AT ALL
2. FEELING DOWN, DEPRESSED OR HOPELESS: NOT AT ALL
SUM OF ALL RESPONSES TO PHQ QUESTIONS 1-9: 0
SUM OF ALL RESPONSES TO PHQ QUESTIONS 1-9: 0
SUM OF ALL RESPONSES TO PHQ9 QUESTIONS 1 & 2: 0
SUM OF ALL RESPONSES TO PHQ QUESTIONS 1-9: 0

## 2024-09-13 ASSESSMENT — VISUAL ACUITY: OU: 1

## 2025-01-22 ENCOUNTER — TELEPHONE (OUTPATIENT)
Age: 85
End: 2025-01-22

## 2025-01-22 NOTE — TELEPHONE ENCOUNTER
Patient arrived in person to have forms filled out for her [employer] Member information form Special Supplemental Benefits for the Chronically Ill.    Faxed to number on form with receipt confirmation

## 2025-02-17 ENCOUNTER — TRANSCRIBE ORDERS (OUTPATIENT)
Facility: HOSPITAL | Age: 85
End: 2025-02-17

## 2025-02-17 DIAGNOSIS — Z78.0 POSTMENOPAUSAL: Primary | ICD-10-CM

## 2025-02-17 DIAGNOSIS — M81.0 OSTEOPOROSIS, UNSPECIFIED OSTEOPOROSIS TYPE, UNSPECIFIED PATHOLOGICAL FRACTURE PRESENCE: ICD-10-CM

## 2025-03-03 ENCOUNTER — OFFICE VISIT (OUTPATIENT)
Age: 85
End: 2025-03-03
Payer: MEDICARE

## 2025-03-03 VITALS
DIASTOLIC BLOOD PRESSURE: 84 MMHG | SYSTOLIC BLOOD PRESSURE: 142 MMHG | WEIGHT: 145.4 LBS | HEART RATE: 61 BPM | BODY MASS INDEX: 25.76 KG/M2 | OXYGEN SATURATION: 96 % | HEIGHT: 63 IN

## 2025-03-03 DIAGNOSIS — I10 ESSENTIAL (PRIMARY) HYPERTENSION: ICD-10-CM

## 2025-03-03 DIAGNOSIS — I47.10 PSVT (PAROXYSMAL SUPRAVENTRICULAR TACHYCARDIA): Primary | ICD-10-CM

## 2025-03-03 DIAGNOSIS — R60.0 PERIPHERAL EDEMA: ICD-10-CM

## 2025-03-03 DIAGNOSIS — E78.00 HYPERCHOLESTEROLEMIA: ICD-10-CM

## 2025-03-03 DIAGNOSIS — I25.10 CORONARY ARTERY CALCIFICATION SEEN ON CAT SCAN: ICD-10-CM

## 2025-03-03 PROCEDURE — 3079F DIAST BP 80-89 MM HG: CPT | Performed by: NURSE PRACTITIONER

## 2025-03-03 PROCEDURE — 1123F ACP DISCUSS/DSCN MKR DOCD: CPT | Performed by: NURSE PRACTITIONER

## 2025-03-03 PROCEDURE — 3077F SYST BP >= 140 MM HG: CPT | Performed by: NURSE PRACTITIONER

## 2025-03-03 PROCEDURE — 1159F MED LIST DOCD IN RCRD: CPT | Performed by: NURSE PRACTITIONER

## 2025-03-03 PROCEDURE — 99214 OFFICE O/P EST MOD 30 MIN: CPT | Performed by: NURSE PRACTITIONER

## 2025-03-03 PROCEDURE — 1126F AMNT PAIN NOTED NONE PRSNT: CPT | Performed by: NURSE PRACTITIONER

## 2025-03-03 PROCEDURE — 93000 ELECTROCARDIOGRAM COMPLETE: CPT | Performed by: NURSE PRACTITIONER

## 2025-03-03 RX ORDER — BENAZEPRIL HYDROCHLORIDE 40 MG/1
40 TABLET ORAL DAILY
COMMUNITY
Start: 2025-01-21

## 2025-03-03 RX ORDER — ACETAMINOPHEN 160 MG
TABLET,DISINTEGRATING ORAL EVERY MORNING
COMMUNITY
Start: 2025-02-17

## 2025-03-03 RX ORDER — LEVOTHYROXINE SODIUM 75 UG/1
75 TABLET ORAL DAILY
COMMUNITY
Start: 2025-02-17

## 2025-03-03 RX ORDER — HYDROCHLOROTHIAZIDE 12.5 MG/1
12.5 TABLET ORAL DAILY
COMMUNITY
Start: 2025-02-17

## 2025-03-03 ASSESSMENT — PATIENT HEALTH QUESTIONNAIRE - PHQ9
1. LITTLE INTEREST OR PLEASURE IN DOING THINGS: NOT AT ALL
SUM OF ALL RESPONSES TO PHQ QUESTIONS 1-9: 0
2. FEELING DOWN, DEPRESSED OR HOPELESS: NOT AT ALL

## 2025-03-03 NOTE — PROGRESS NOTES
1. Have you been to the ER, urgent care clinic since your last visit?  Hospitalized since your last visit?No    2. Have you seen or consulted any other health care providers outside of the [facility] since your last visit?  Include any pap smears or colon screening. No    
healthy eating habits, smoking cessation, limiting or eliminating alcohol intake, and avoidance of recreational drug use.    I have emphasized the importance of adherence to the current medication regimen as well as routine follow up for preventative care measures.      Christopher Bustillos NP  [facility] at [facility]  [address] , [address], [facility], [address]  P: [phone]  F: [phone]

## 2025-03-03 NOTE — PATIENT INSTRUCTIONS
If you have any recurrent episodes of that fast heartbeat you can try to do to do the following things to see if it helps it to go away:    1.  You can try to splash cold water and your face, sometimes this helps the heart to reset.  2.  You can use your thumb or an object like a marker and blow on it forcefully for up to 30 seconds and this sometimes helps the heart to reset as well.  This maneuver works even better if you lay on your back and elevate your feet above your head.      No changes to your medications today.  No new test needed.  Plan on a 6-month follow-up or sooner as needed for any concerns.

## 2025-03-10 ENCOUNTER — TELEPHONE (OUTPATIENT)
Age: 85
End: 2025-03-10

## 2025-03-14 ENCOUNTER — TELEPHONE (OUTPATIENT)
Age: 85
End: 2025-03-14

## 2025-03-14 RX ORDER — DILTIAZEM HYDROCHLORIDE 240 MG/1
240 CAPSULE, COATED, EXTENDED RELEASE ORAL DAILY
Qty: 90 CAPSULE | Refills: 1 | Status: SHIPPED | OUTPATIENT
Start: 2025-03-14

## 2025-03-14 NOTE — TELEPHONE ENCOUNTER
Requested Prescriptions     Signed Prescriptions Disp Refills    dilTIAZem (CARDIZEM CD) 240 MG extended release capsule 90 capsule 1     Sig: Take 1 capsule by mouth daily     Authorizing Provider: CHRISTOPHER BUSTILLOS     Ordering User: RUBEN JOHNSON      Future Appointments   Date Time Provider Department Center   3/20/2025 11:00 AM [facility] DEXA 1 [facility]   9/8/2025  9:20 AM Christopher Bustillos APRN - NP [facility]      Per Verbal Order by MD/NP

## 2025-03-20 ENCOUNTER — HOSPITAL ENCOUNTER (OUTPATIENT)
Facility: HOSPITAL | Age: 85
Discharge: HOME | End: 2025-03-23
Payer: MEDICARE

## 2025-03-20 VITALS — HEIGHT: 63 IN | BODY MASS INDEX: 26.05 KG/M2 | WEIGHT: 147 LBS

## 2025-03-20 DIAGNOSIS — Z78.0 POSTMENOPAUSAL: ICD-10-CM

## 2025-03-20 DIAGNOSIS — M81.0 OSTEOPOROSIS, UNSPECIFIED OSTEOPOROSIS TYPE, UNSPECIFIED PATHOLOGICAL FRACTURE PRESENCE: ICD-10-CM

## 2025-03-20 PROCEDURE — 77080 DXA BONE DENSITY AXIAL: CPT
